# Patient Record
Sex: FEMALE | Race: BLACK OR AFRICAN AMERICAN | URBAN - METROPOLITAN AREA
[De-identification: names, ages, dates, MRNs, and addresses within clinical notes are randomized per-mention and may not be internally consistent; named-entity substitution may affect disease eponyms.]

---

## 2021-09-02 ENCOUNTER — TELEPHONE (OUTPATIENT)
Dept: BARIATRICS/WEIGHT MGMT | Age: 36
End: 2021-09-02

## 2021-09-02 NOTE — TELEPHONE ENCOUNTER
Online Info Session Completed:  on 7/14/21 okay to schedule with Dr. Fransisca Soto   with bc/bs    Patient informed the following: This is NOT a guarantee of payment  When stating that you have a Benefit or Coverage for Bariatric Surgery - that means that you may qualify for the surgery  Bariatric Surgery is considered an elective procedure, patient is responsible to know their benefits . Any information we obtain when calling your insurance  is not  a guarantee of  coverage  and/or  benefit. Appointment Note :   New Patient , bc/bs,   6   month visits,  PG Fee $200,  Mailed Packet or advised to arrive  early      Remind Patient of $200 Program fee with $ 100 required at Second visit with office on initial dietician visit. Remind Patient they must be nicotine free. They will be tested at the beginning of the program and prior to surgery. Advise Patient Responsible for out of pocket, copay at medical visits, Deductible and coinsurance applied to medical visits and procedure. You will be responsible for any of the following:  · Copays   · Deductibles 3200.00  · Co insurances 5000.00    The items mentioned above are indicated or required by your insurance plan. Your deductible and coinsurance are applied to medical visits and procedures. Verified with patient if he or she has had any previous bariatric surgery? no  ( If yes ,advise patient of transfer of care process and program fee)       .

## 2021-09-23 ENCOUNTER — OFFICE VISIT (OUTPATIENT)
Dept: BARIATRICS/WEIGHT MGMT | Age: 36
End: 2021-09-23
Payer: COMMERCIAL

## 2021-09-23 VITALS
HEIGHT: 62 IN | HEART RATE: 96 BPM | WEIGHT: 293 LBS | DIASTOLIC BLOOD PRESSURE: 82 MMHG | BODY MASS INDEX: 53.92 KG/M2 | SYSTOLIC BLOOD PRESSURE: 134 MMHG | RESPIRATION RATE: 20 BRPM

## 2021-09-23 DIAGNOSIS — R06.09 DYSPNEA ON EXERTION: Primary | ICD-10-CM

## 2021-09-23 DIAGNOSIS — E66.01 MORBID OBESITY WITH BMI OF 70 AND OVER, ADULT (HCC): ICD-10-CM

## 2021-09-23 DIAGNOSIS — K21.9 GASTROESOPHAGEAL REFLUX DISEASE WITHOUT ESOPHAGITIS: ICD-10-CM

## 2021-09-23 DIAGNOSIS — R06.83 SNORING: ICD-10-CM

## 2021-09-23 PROCEDURE — 99204 OFFICE O/P NEW MOD 45 MIN: CPT | Performed by: SURGERY

## 2021-09-23 NOTE — PROGRESS NOTES
patients only)    [x] Lifelong diet expectations reviewed with patient    [x] Need for lifelong vitamin supplementation reviewed with patient    PHYSICAL EXAMINATION:      /82 (Site: Right Lower Arm, Position: Sitting, Cuff Size: Large Adult)   Pulse 96   Resp 20   Ht 5' 1.5\" (1.562 m)   Wt (!) 378 lb (171.5 kg)   LMP  (Approximate)   BMI 70.27 kg/m²     Constitutional:  Vital signs are normal. The patient appears well-developed   HEENT:      Head: Normocephalic. Atraumatic     Eyes: pupils are equal and reactive. No scleral icterus is present. Neck: No mass and no thyromegaly present. Cardiovascular: Normal rate, regular rhythm, S1 normal and S2 normal.  Bilateral pulses present. Pulmonary/Chest: Effort normal and breath sounds normal. No retractions. Abdominal: Soft. Normal appearance. There is no organomegaly. No tenderness. There is no rigidity, no rebound, no guarding and no Recinos's sign. Musculoskeletal:      Right lower leg: Normal. No tenderness and no edema. Left lower leg: Normal. No tenderness and no edema. Lymphadenopathy:     No cervical adenopathy, No Exrtemity Adenopathy. Neurological: The patient is alert and oriented. Moving all four extremities equally, sensation grossly intact bilateral.  Skin: Skin is warm, dry and intact. Psychiatric: The patient has a normal mood and affect. Speech is normal and behavior is normal. Judgment and thought content normal. Cognition and memory are normal.     RECOMMENDATIONS:     We spent a great deal of time discussing the risks and benefits of Sleeve Gastrectomy, including but not limited to injury to intra-abdominal organs, breakdown of the gastric staple line, the need for re-operative therapy,  prolonged hospitalization,  mechanical ventilation,  and death.  We discussed the possibility of bleeding, the need for blood transfusions, blood clots, hospital-acquired and intra-abdominal infection, anastomotic stricture, and date of surgery    Labwork:  Final Lab Tests  within 3 months of date of surgery (CBC, PT/PTT, BMP)     Electronically signed by Elise Bullock DO on 9/23/2021 at 1:02 PM

## 2021-10-14 ENCOUNTER — NURSE ONLY (OUTPATIENT)
Dept: BARIATRICS/WEIGHT MGMT | Age: 36
End: 2021-10-14

## 2021-10-14 NOTE — PROGRESS NOTES
Medical Nutrition Therapy  Initial Nutrition Assessment for Metabolic/ Bariatric Surgery  Required insurance visit prior to surgery:  6  Shared with patient the importance of documenting exercise and staying at or below start weight during visits. Pt reports:     Jenae Sr is a 31 y.o. female with a date of birth of 1985. Weight History: Wt Readings from Last 3 Encounters:   09/23/21 (!) 378 lb (171.5 kg)        How does your weight affect your daily activities?fatigue      What would be different in your life if you felt healthier and fit? Why is that important to you now? Don't want to get chronic disease  Do you drink alcohol? . YES, occasionally    Do you use tobacco in the form of cigarettes, cigars, chew or any vapor appliance? No    Weight History      Jesus Adkins's highest adult weight was 378 lbs at age . Patient was at her highest weight for  . Patient's triggers/known causes to her highest weight are . Jesus Adkins's lowest adult weight was 190 lbs at age . Patient was at her lowest weight for  . The lowest weight was achieved through  . Physical Activity  Do you participate in a structured exercise program, step counting or regular physical activity? no      Instructions and exercise logs were provided to patient today see goal sheet and plan. Previous weight loss attempts  Patient has participated in the following weight loss programs:   Calorie restriction, , Scott Tobias, Keto      Nutrition History  Have you ever been diagnosed with an eating disorder? No  Have you ever had problems tolerating a multivitamin or mineral supplement?no  Have you ever been diagnosed with a vitamin or mineral deficiency? no     Patient dines out to a sit down restaurant 3 times per week. Patient dines out to a fast food restaurant 2 times per month. Patient does have grazing. Patient does have night eating.    Patient does not have a history of emotional eating. Patient does have a history of  eating out of boredom. Drinks throughout the day: water, body armour    24 hour recall/food frequency: has been scanned into chart unless completed below. Surgery  Patient's greatest concern about having surgery is: anasthesia. How will you know when you've been successful? Assessment:  Nutritional Needs:  Men: 1500kcal daily minimum     Women 1200kcal daily minimum. 60-80gm of protein daily  PES Statement:  Obesity related to a complex combination of decreased energy needs, disordered eating patterns, physical inactivity, and increased psychological/life stress as evidenced by BMI greater than 30 and inability to maintain a significant amount of weight loss through conventional weight loss interventions. Goals    All goals were planned with and agreed on by the patient. I want to improve my health because for better health  appt # NA G What is your next step? C 1 2 3 4 5 6 7 8 9     0  1 I will read the education binder provided to me and the                 0  2 I will make my pschological evaluation appoinment. 0  3 I will bring this goal card to every appointment. x 4 I will eliminate all tobacco/nicotine. x 5 I will limit alcoholic beverages to 1-1VX per week. 6 I will limit dining out to 3 times per week or less. x 7 I will eliminate sugary beverages. x 8 I will eliminate carbonated beverages. 9 I will eliminate drinking with a straw. 10 I will limit caffeinated beverages to 16oz daily. 0  11 I will limit log my exercise daily. 12 I will determine my calcium and mvi plan. 13 I will have 1-2 servings of lean protein present at each meal and minimeal.                 14 I will eat every 3-5 hours. 15 I will drink 64oz of fluid daily. 16 I will eat slowly during meals and snacks. 17 I will limit fluids 4oz before after and during meals. 18 I will eat protein first at all meals followed by vegetables,  Fruit and lastly whole grains. 23 My first weight neutral approach is:                 20 My second weight neutral approach is:                 21  My Thirds weight neutral approach is:                 22                  23                  24                  25                  Goals reviewed with patient as below  Do you understand your goals? Do you have the information you need to achieve your goals? Do you have any questions  right now? Plan    Exercise for Health 15 easy exercises to do at home with 6 activity logs were provided to the patient with verbal and written instructions on how to carry this out. Goal number 14 was provided to the patient on this visit please see above. Will follow up each month and provide support as patient begins to add physical activity to life style. Monitor and review goals adjust as needed. Follow up monthly supervised diet and exercise.        Gillian Carmona, NIMESH, LD

## 2021-11-22 ENCOUNTER — TELEPHONE (OUTPATIENT)
Dept: BARIATRICS/WEIGHT MGMT | Age: 36
End: 2021-11-22

## 2022-01-06 ENCOUNTER — TELEPHONE (OUTPATIENT)
Dept: BARIATRICS/WEIGHT MGMT | Age: 37
End: 2022-01-06

## 2022-01-28 ENCOUNTER — OFFICE VISIT (OUTPATIENT)
Dept: BARIATRICS/WEIGHT MGMT | Age: 37
End: 2022-01-28
Payer: COMMERCIAL

## 2022-01-28 VITALS
BODY MASS INDEX: 53.92 KG/M2 | SYSTOLIC BLOOD PRESSURE: 137 MMHG | HEIGHT: 62 IN | WEIGHT: 293 LBS | DIASTOLIC BLOOD PRESSURE: 80 MMHG | HEART RATE: 92 BPM

## 2022-01-28 DIAGNOSIS — K21.9 GASTROESOPHAGEAL REFLUX DISEASE WITHOUT ESOPHAGITIS: ICD-10-CM

## 2022-01-28 DIAGNOSIS — E66.01 MORBID OBESITY WITH BMI OF 70 AND OVER, ADULT (HCC): ICD-10-CM

## 2022-01-28 PROCEDURE — 99213 OFFICE O/P EST LOW 20 MIN: CPT | Performed by: NURSE PRACTITIONER

## 2022-01-28 RX ORDER — IBUPROFEN 800 MG/1
TABLET ORAL
COMMUNITY

## 2022-01-28 RX ORDER — ALPRAZOLAM 1 MG/1
TABLET ORAL
COMMUNITY
Start: 2021-12-30

## 2022-01-28 RX ORDER — FAMOTIDINE 20 MG/1
TABLET, FILM COATED ORAL
COMMUNITY
Start: 2021-12-30 | End: 2022-08-31

## 2022-01-28 RX ORDER — KETOCONAZOLE 20 MG/ML
SHAMPOO TOPICAL
COMMUNITY
Start: 2021-12-07 | End: 2022-08-31

## 2022-01-28 RX ORDER — MEDROXYPROGESTERONE ACETATE 150 MG/ML
INJECTION, SUSPENSION INTRAMUSCULAR
COMMUNITY
End: 2022-03-18 | Stop reason: ALTCHOICE

## 2022-01-28 RX ORDER — ALBUTEROL SULFATE 90 UG/1
AEROSOL, METERED RESPIRATORY (INHALATION)
COMMUNITY
Start: 2021-12-30

## 2022-01-28 NOTE — PROGRESS NOTES
Medical Weight Management Progress Note    Subjective      Patient being seen for medically supervised weight loss for the chronic conditions of GERD. She is working on the behavior changes discussed at the initial appointment. Patient continues on diet plan. Physical activity includes exercising at the gym. Weight today is 381 lbs. Needs to schedule sleep study, psych eval, and EGD. No current issues. Working toward bariatric surgery:    [x] Sleeve Gastrectomy                                                           [] Briana-en-Y Gastric Bypass    Allergies:  No Known Allergies    Past Medical History:   History reviewed. No pertinent past medical history. .    Past Surgical History:  Past Surgical History:   Procedure Laterality Date     SECTION         Family History:  History reviewed. No pertinent family history. Social History:  Social History     Socioeconomic History    Marital status: Single     Spouse name: Not on file    Number of children: Not on file    Years of education: Not on file    Highest education level: Not on file   Occupational History    Not on file   Tobacco Use    Smoking status: Former Smoker    Smokeless tobacco: Never Used   Substance and Sexual Activity    Alcohol use: Yes     Alcohol/week: 1.0 standard drink     Types: 1 Glasses of wine per week     Comment: occ    Drug use: Never    Sexual activity: Not on file   Other Topics Concern    Not on file   Social History Narrative    Not on file     Social Determinants of Health     Financial Resource Strain:     Difficulty of Paying Living Expenses: Not on file   Food Insecurity:     Worried About Running Out of Food in the Last Year: Not on file    Yung of Food in the Last Year: Not on file   Transportation Needs:     Lack of Transportation (Medical): Not on file    Lack of Transportation (Non-Medical):  Not on file   Physical Activity:     Days of Exercise per Week: Not on file    Minutes of Exercise per Session: Not on file   Stress:     Feeling of Stress : Not on file   Social Connections:     Frequency of Communication with Friends and Family: Not on file    Frequency of Social Gatherings with Friends and Family: Not on file    Attends [employer] Services: Not on file    Active Member of [address] [place] [address] or Organizations: Not on file    Attends Club or Organization Meetings: Not on file    Marital Status: Not on file   Intimate Partner Violence:     Fear of Current or Ex-Partner: Not on file    Emotionally Abused: Not on file    Physically Abused: Not on file    Sexually Abused: Not on file   Housing Stability:     Unable to Pay for Housing in the Last Year: Not on file    Number of Jicelestemomarisela in the Last Year: Not on file    Unstable Housing in the Last Year: Not on file       Current Medications:  Current Outpatient Medications   Medication Sig Dispense Refill    famotidine (PEPCID) 20 MG tablet       ALPRAZolam (XANAX) 1 MG tablet       medroxyPROGESTERone (DEPO-PROVERA) 150 MG/ML injection medroxyprogesterone 150 mg/mL intramuscular suspension   inject 1ml intramuscularly once monthly      ketoconazole (NIZORAL) 2 % shampoo APPLY SHAMPOO TOPICIALLY THREE TIMES WEEKLY      ibuprofen (ADVIL;MOTRIN) 800 MG tablet ibuprofen 800 mg tablet      albuterol sulfate  (90 Base) MCG/ACT inhaler       diphenhydrAMINE HCl (BENADRYL ALLERGY PO) Take by mouth       No current facility-administered medications for this visit. Vital Signs:  /80 (Site: Left Lower Arm, Position: Sitting, Cuff Size: Large Adult)   Pulse 92   Ht 5' 1.5\" (1.562 m)   Wt (!) 381 lb (172.8 kg)   BMI 70.82 kg/m²     BMI/Height/Weight:  Body mass index is 70.82 kg/m². Review of Systems - A review of systems was performed. All was negative unless otherwise documented in HPI. Constitutional: Negative for fever, chills and diaphoresis. HENT: Negative for hearing loss and trouble swallowing.    Eyes: Negative for photophobia and visual disturbance. Respiratory: Negative for cough, shortness of breath and wheezing. Cardiovascular: Negative for chest pain and palpitations. Gastrointestinal: Negative for nausea, vomiting, abdominal pain, diarrhea, constipation, blood in stool and abdominal distention. Endocrine: Negative for polydipsia, polyphagia and polyuria. Genitourinary: Negative for dysuria, frequency, hematuria and difficulty urinating. Musculoskeletal: Negative for myalgias, joint swelling. Skin: Negative for pallor and rash. Neurological: Negative for dizziness, tremors, light-headedness and headaches. Psychiatric/Behavioral: Negative for sleep disturbance and dysphoric mood. Objective:      Physical Exam   Vital signs reviewed. General: Well-developed and well-nourished. No acute distress. Skin: Warm, dry and intact. HEENT: Normocephalic. EOMs intact. Conjunctivae normal. Neck supple. Cardiovascular: Normal rate, regular rhythm. Pulmonary/Chest: Normal effort. Lungs clear to auscultation. No rales, rhonchi or wheezing. Abdominal: Positive bowel sounds. Soft, nontender. Nondistended. Musculoskeletal: Movement x4. No edema. Neurological: Gait normal. Alert and oriented to person, place, and time. Psychiatric: Normal mood and affect. Speech and behavior normal. Judgment and thought content normal. Cognition and memory intact. Assessment:       Diagnosis Orders   1. Gastroesophageal reflux disease without esophagitis  CBC Auto Differential    Comprehensive Metabolic Panel    Hemoglobin A1C    Iron and TIBC    Magnesium    Lipid Panel    PTH, Intact    TSH without Reflex    Vitamin A    Vitamin B1    Vitamin D 25 Hydroxy    Vitamin B12 & Folate    Zinc   2.  Morbid obesity with BMI of 70 and over, adult ([facility] [address]  CBC Auto Differential    Comprehensive Metabolic Panel    Hemoglobin A1C    Iron and TIBC    Magnesium    Lipid Panel    PTH, Intact    TSH without Reflex Vitamin A    Vitamin B1    Vitamin D 25 Hydroxy    Vitamin B12 & Folate    Zinc       Plan:    Dietitian visit today. Patient was encouraged to journal all food intake. Keep calorie level at approximately 4972-7845. Protein intake is to be a minimum of 60-80 grams per day. Water drinking was encouraged with a goal of 64oz-128oz daily. Beverages to be calorie free except for milk. Every other beverage should be water. Avoid soda. Continue to increase level of physical activity. Encouraged use of exercise log. Follow-up  Return in about 1 month (around 2/28/2022). Orders this encounter:  Orders Placed This Encounter   Procedures    CBC Auto Differential     Standing Status:   Future     Standing Expiration Date:   1/28/2023    Comprehensive Metabolic Panel     Standing Status:   Future     Standing Expiration Date:   1/28/2023    Hemoglobin A1C     Standing Status:   Future     Standing Expiration Date:   1/28/2023    Iron and TIBC     Standing Status:   Future     Standing Expiration Date:   1/28/2023     Order Specific Question:   Is Patient Fasting? Answer:   yes     Order Specific Question:   No of Hours?      Answer:   15    Magnesium     Standing Status:   Future     Standing Expiration Date:   1/28/2023    Lipid Panel     Standing Status:   Future     Standing Expiration Date:   1/28/2023     Order Specific Question:   Is Patient Fasting?/# of Hours     Answer:   12    PTH, Intact     Standing Status:   Future     Standing Expiration Date:   1/28/2023    TSH without Reflex     Standing Status:   Future     Standing Expiration Date:   1/28/2023    Vitamin A     Standing Status:   Future     Standing Expiration Date:   1/28/2023    Vitamin B1     Standing Status:   Future     Standing Expiration Date:   1/28/2023    Vitamin D 25 Hydroxy     Standing Status:   Future     Standing Expiration Date:   1/28/2023    Vitamin B12 & Folate     Standing Status:   Future     Standing Expiration Date:   1/28/2023    Zinc     Standing Status:   Future     Standing Expiration Date:   1/28/2023       Prescriptions this encounter:  No orders of the defined types were placed in this encounter.       Electronically signed by:  Olga Lidia Mcclure CNP

## 2022-01-28 NOTE — PROGRESS NOTES
Medical Nutrition Therapy   Metabolic and Bariatric Surgery         Supervised diet and exercise preparation  Visit 1 out of 6  Pt reports:  She forgot her exercise logs and education book but states she is going to the gym regularly. Changes in eating patterns to promote health are noted below on the goals number 19-22    Vitals: Wt Readings from Last 3 Encounters:   01/28/22 (!) 381 lb (172.8 kg)   09/23/21 (!) 378 lb (171.5 kg)         Nutrition Assessment:   PES: Knowledge deficit related to healthy behaviors that support weight management post weight loss surgery as evidenced by Body mass index is 70.82 kg/m². Nutrition Assessment of Goal Attainment:  TREATMENT GOALS:    1. Pt  Completed 0 out of 4 goals. 2.TREATMENT GOALS FOR UPCOMING WEEK: continue all previous goals and add: # 13, 19    All goals were planned with and agreed on by the patient. I want to improve my health because for better health  appt # NA G What is your next step? C 1 2 3 4 5 6 7 8 9     1  1 I will read the education binder provided to me and the    0             1  2 I will make my pschological evaluation appoinment. 0             1  3 I will bring this goal card to every appointment. 0              x 4 I will eliminate all tobacco/nicotine. x 5 I will limit alcoholic beverages to 8-5JA per week. 6 I will limit dining out to 3 times per week or less. x 7 I will eliminate sugary beverages. x 8 I will eliminate carbonated beverages. 9 I will eliminate drinking with a straw. 10 I will limit caffeinated beverages to 16oz daily. 1  11 I will limit log my exercise daily. 0               12 I will determine my calcium and mvi plan. 1  13 I will have 1-2 servings of lean protein present at each meal and minimeal.                 14 I will eat every 3-5 hours.                  15 I will drink 64oz of fluid daily. 16 I will eat slowly during meals and snacks. 17 I will limit fluids 4oz before after and during meals. 18 I will eat protein first at all meals followed by vegetables,  Fruit and lastly whole grains. 1  19 My first weight neutral approach is:  I will eat more colorful vegetables. 20 My second weight neutral approach is:                 21  My Thirds weight neutral approach is:                 22                  23                  24                    Questions asked for goal understanding:                                                  Do you understand your goals? Do you have the information you need to achieve your goals? Do you have any questions  right now? []  Consistent goal achievement in the program thus far and further success with goals is expected. []  Unable to consistently make progress in goal achievement. At this time patient is not moving forward  in developing the skills needed for success after surgery. Plan:    Continue to follow monthly and review goals.          [x]  Nutrition visits complete    []

## 2022-02-24 ENCOUNTER — OFFICE VISIT (OUTPATIENT)
Dept: BARIATRICS/WEIGHT MGMT | Age: 37
End: 2022-02-24
Payer: COMMERCIAL

## 2022-02-24 VITALS
HEART RATE: 97 BPM | HEIGHT: 62 IN | DIASTOLIC BLOOD PRESSURE: 70 MMHG | WEIGHT: 293 LBS | BODY MASS INDEX: 53.92 KG/M2 | SYSTOLIC BLOOD PRESSURE: 113 MMHG

## 2022-02-24 DIAGNOSIS — K21.9 GASTROESOPHAGEAL REFLUX DISEASE WITHOUT ESOPHAGITIS: Primary | ICD-10-CM

## 2022-02-24 DIAGNOSIS — E66.01 MORBID OBESITY WITH BMI OF 70 AND OVER, ADULT (HCC): ICD-10-CM

## 2022-02-24 PROCEDURE — 99213 OFFICE O/P EST LOW 20 MIN: CPT | Performed by: NURSE PRACTITIONER

## 2022-02-24 RX ORDER — SULFAMETHOXAZOLE AND TRIMETHOPRIM 800; 160 MG/1; MG/1
TABLET ORAL
COMMUNITY
Start: 2022-02-22 | End: 2022-03-18 | Stop reason: ALTCHOICE

## 2022-02-24 NOTE — PROGRESS NOTES
Medical Nutrition Therapy   Metabolic and Bariatric Surgery         Supervised diet and exercise preparation  Visit 2 out of 6  Pt reports:  She states she is trying to incorporate small, more frequent meals. Changes in eating patterns to promote health are noted below on the goals number 19-22    Vitals: Wt Readings from Last 3 Encounters:   02/24/22 (!) 390 lb (176.9 kg)   01/28/22 (!) 381 lb (172.8 kg)   09/23/21 (!) 378 lb (171.5 kg)         Nutrition Assessment:   PES: Knowledge deficit related to healthy behaviors that support weight management post weight loss surgery as evidenced by Body mass index is 72.5 kg/m². Nutrition Assessment of Goal Attainment:  TREATMENT GOALS:    1. Pt  Completed 5 out of 6 goals. 2.TREATMENT GOALS FOR UPCOMING WEEK: continue all previous goals and add: # 0    All goals were planned with and agreed on by the patient. I want to improve my health because for better health  appt # NA G What is your next step? C 1 2 3 4 5 6 7 8 9     1  1 I will read the education binder provided to me and the   x 0 100            2  2 I will make my pschological evaluation appoinment. 0 0            2  3 I will bring this goal card to every appointment. 0 100             x 4 I will eliminate all tobacco/nicotine. x 5 I will limit alcoholic beverages to 7-8FH per week. 6 I will limit dining out to 3 times per week or less. x 7 I will eliminate sugary beverages. x 8 I will eliminate carbonated beverages. 9 I will eliminate drinking with a straw. 10 I will limit caffeinated beverages to 16oz daily. 2  11 I will limit log my exercise daily. 0 100              12 I will determine my calcium and mvi plan. 2  13 I will have 1-2 servings of lean protein present at each meal and minimeal.   100              14 I will eat every 3-5 hours.                  15 I will drink 64oz of fluid daily. 16 I will eat slowly during meals and snacks. 17 I will limit fluids 4oz before after and during meals. 18 I will eat protein first at all meals followed by vegetables,  Fruit and lastly whole grains. 2  19 My first weight neutral approach is:  I will eat more colorful vegetables. 100              20 My second weight neutral approach is:                 21  My Thirds weight neutral approach is:                 22                  23                  24                    Questions asked for goal understanding:                                                  Do you understand your goals? Do you have the information you need to achieve your goals? Do you have any questions  right now? [x]  Consistent goal achievement in the program thus far and further success with goals is expected. []  Unable to consistently make progress in goal achievement. At this time patient is not moving forward  in developing the skills needed for success after surgery. Plan:    Continue to follow monthly and review goals.          [x]  Nutrition visits complete    []

## 2022-02-24 NOTE — PROGRESS NOTES
Medical Weight Management Progress Note    Subjective      Patient being seen for medically supervised weight loss for the chronic conditions of GERD. She is working on the behavior changes discussed at the initial appointment. Patient continues on diet plan. Physical activity includes exercising at the gym. Weight gain of 9 lbs since last visit. Needs to schedule sleep study, psych eval, and EGD. Needs to have labs drawn. No current issues. Working toward bariatric surgery:    [x] Sleeve Gastrectomy                                                           [] Briana-en-Y Gastric Bypass    Allergies:  No Known Allergies    Past Medical History:   History reviewed. No pertinent past medical history. .    Past Surgical History:  Past Surgical History:   Procedure Laterality Date     SECTION         Family History:  History reviewed. No pertinent family history. Social History:  Social History     Socioeconomic History    Marital status: Single     Spouse name: Not on file    Number of children: Not on file    Years of education: Not on file    Highest education level: Not on file   Occupational History    Not on file   Tobacco Use    Smoking status: Former Smoker    Smokeless tobacco: Never Used   Substance and Sexual Activity    Alcohol use: Yes     Alcohol/week: 1.0 standard drink     Types: 1 Glasses of wine per week     Comment: occ    Drug use: Never    Sexual activity: Not on file   Other Topics Concern    Not on file   Social History Narrative    Not on file     Social Determinants of Health     Financial Resource Strain:     Difficulty of Paying Living Expenses: Not on file   Food Insecurity:     Worried About Running Out of Food in the Last Year: Not on file    Yung of Food in the Last Year: Not on file   Transportation Needs:     Lack of Transportation (Medical): Not on file    Lack of Transportation (Non-Medical):  Not on file   Physical Activity:     Days of Exercise per Week: Not on file    Minutes of Exercise per Session: Not on file   Stress:     Feeling of Stress : Not on file   Social Connections:     Frequency of Communication with Friends and Family: Not on file    Frequency of Social Gatherings with Friends and Family: Not on file    Attends Pentec[employer]alism Services: Not on file    Active Member of [address] [place] [address] or Organizations: Not on file    Attends Club or Organization Meetings: Not on file    Marital Status: Not on file   Intimate Partner Violence:     Fear of Current or Ex-Partner: Not on file    Emotionally Abused: Not on file    Physically Abused: Not on file    Sexually Abused: Not on file   Housing Stability:     Unable to Pay for Housing in the Last Year: Not on file    Number of [place] in the Last Year: Not on file    Unstable Housing in the Last Year: Not on file       Current Medications:  Current Outpatient Medications   Medication Sig Dispense Refill    sulfamethoxazole-trimethoprim (BACTRIM DS;SEPTRA DS) 800-160 MG per tablet TAKE 1 TABLET BY MOUTH EVERY 12 HOURS AS DIRECTED      famotidine (PEPCID) 20 MG tablet       ALPRAZolam (XANAX) 1 MG tablet       medroxyPROGESTERone (DEPO-PROVERA) 150 MG/ML injection medroxyprogesterone 150 mg/mL intramuscular suspension   inject 1ml intramuscularly once monthly      ketoconazole (NIZORAL) 2 % shampoo APPLY SHAMPOO TOPICIALLY THREE TIMES WEEKLY      ibuprofen (ADVIL;MOTRIN) 800 MG tablet ibuprofen 800 mg tablet      albuterol sulfate  (90 Base) MCG/ACT inhaler       diphenhydrAMINE HCl (BENADRYL ALLERGY PO) Take by mouth       No current facility-administered medications for this visit. Vital Signs:  /70 (Site: Right Lower Arm, Position: Sitting, Cuff Size: Large Adult)   Pulse 97   Ht 5' 1.5\" (1.562 m)   Wt (!) 390 lb (176.9 kg)   BMI 72.50 kg/m²     BMI/Height/Weight:  Body mass index is 72.5 kg/m². Review of Systems - A review of systems was performed.   All was negative unless otherwise documented in HPI. Constitutional: Negative for fever, chills and diaphoresis. HENT: Negative for hearing loss and trouble swallowing. Eyes: Negative for photophobia and visual disturbance. Respiratory: Negative for cough, shortness of breath and wheezing. Cardiovascular: Negative for chest pain and palpitations. Gastrointestinal: Negative for nausea, vomiting, abdominal pain, diarrhea, constipation, blood in stool and abdominal distention. Endocrine: Negative for polydipsia, polyphagia and polyuria. Genitourinary: Negative for dysuria, frequency, hematuria and difficulty urinating. Musculoskeletal: Negative for myalgias, joint swelling. Skin: Negative for pallor and rash. Neurological: Negative for dizziness, tremors, light-headedness and headaches. Psychiatric/Behavioral: Negative for sleep disturbance and dysphoric mood. Objective:      Physical Exam   Vital signs reviewed. General: Well-developed and well-nourished. No acute distress. Skin: Warm, dry and intact. HEENT: Normocephalic. EOMs intact. Conjunctivae normal. Neck supple. Cardiovascular: Normal rate, regular rhythm. Pulmonary/Chest: Normal effort. Lungs clear to auscultation. No rales, rhonchi or wheezing. Abdominal: Positive bowel sounds. Soft, nontender. Nondistended. Musculoskeletal: Movement x4. No edema. Neurological: Gait normal. Alert and oriented to person, place, and time. Psychiatric: Normal mood and affect. Speech and behavior normal. Judgment and thought content normal. Cognition and memory intact. Assessment:       Diagnosis Orders   1. Gastroesophageal reflux disease without esophagitis     2. Morbid obesity with BMI of 70 and over, adult [facility])         Plan:    Dietitian visit today. Patient was encouraged to journal all food intake. Keep calorie level at approximately 4299-0396. Protein intake is to be a minimum of 60-80 grams per day.  Water drinking was

## 2022-03-10 LAB
ALBUMIN SERPL-MCNC: 3.8 G/DL
ALP BLD-CCNC: 65 U/L
ALT SERPL-CCNC: 14 U/L
ANION GAP SERPL CALCULATED.3IONS-SCNC: 11 MMOL/L
AST SERPL-CCNC: 12 U/L
AVERAGE GLUCOSE: 131
BASOPHILS ABSOLUTE: 0.1 /ΜL
BASOPHILS RELATIVE PERCENT: 0.6 %
BILIRUB SERPL-MCNC: 0.3 MG/DL (ref 0.1–1.4)
BUN BLDV-MCNC: 15 MG/DL
CALCIUM SERPL-MCNC: 8.7 MG/DL
CHLORIDE BLD-SCNC: 108 MMOL/L
CHOLESTEROL, TOTAL: 162 MG/DL
CHOLESTEROL/HDL RATIO: 4.6
CO2: 24 MMOL/L
CREAT SERPL-MCNC: 1.04 MG/DL
EOSINOPHILS ABSOLUTE: 0.1 /ΜL
EOSINOPHILS RELATIVE PERCENT: 1.3 %
FOLATE: 8.4
GFR CALCULATED: NORMAL
GLUCOSE BLD-MCNC: 106 MG/DL
HBA1C MFR BLD: 6.2 %
HCT VFR BLD CALC: 35.7 % (ref 36–46)
HDLC SERPL-MCNC: 35 MG/DL (ref 35–70)
HEMOGLOBIN: 11.8 G/DL (ref 12–16)
IRON: 31
LDL CHOLESTEROL CALCULATED: 69 MG/DL (ref 0–160)
LYMPHOCYTES ABSOLUTE: 2.6 /ΜL
LYMPHOCYTES RELATIVE PERCENT: 29.4 %
MAGNESIUM: 1.7 MG/DL
MCH RBC QN AUTO: 28.7 PG
MCHC RBC AUTO-ENTMCNC: 33.1 G/DL
MCV RBC AUTO: 87 FL
MONOCYTES ABSOLUTE: 0.6 /ΜL
MONOCYTES RELATIVE PERCENT: 6.1 %
NEUTROPHILS ABSOLUTE: 5.6 /ΜL
NEUTROPHILS RELATIVE PERCENT: 62.6 %
NONHDLC SERPL-MCNC: NORMAL MG/DL
PDW BLD-RTO: 15.2 %
PLATELET # BLD: 287 K/ΜL
PMV BLD AUTO: 8.8 FL
POTASSIUM SERPL-SCNC: 3.8 MMOL/L
PTH INTACT: 45
RBC # BLD: 4.11 10^6/ΜL
SODIUM BLD-SCNC: 143 MMOL/L
TOTAL IRON BINDING CAPACITY: NORMAL
TOTAL PROTEIN: 7.2
TRIGL SERPL-MCNC: 290 MG/DL
TSH SERPL DL<=0.05 MIU/L-ACNC: 0.97 UIU/ML
VITAMIN B-12: 176
VITAMIN D 25-HYDROXY: <7
VITAMIN D2, 25 HYDROXY: NORMAL
VITAMIN D3,25 HYDROXY: NORMAL
VLDLC SERPL CALC-MCNC: 58 MG/DL
WBC # BLD: 9 10^3/ML

## 2022-03-16 DIAGNOSIS — K21.9 GASTROESOPHAGEAL REFLUX DISEASE WITHOUT ESOPHAGITIS: ICD-10-CM

## 2022-03-16 DIAGNOSIS — E66.01 MORBID OBESITY WITH BMI OF 70 AND OVER, ADULT (HCC): ICD-10-CM

## 2022-03-17 DIAGNOSIS — E55.9 VITAMIN D DEFICIENCY: ICD-10-CM

## 2022-03-17 DIAGNOSIS — E53.8 LOW VITAMIN B12 LEVEL: ICD-10-CM

## 2022-03-17 DIAGNOSIS — E61.1 LOW IRON: ICD-10-CM

## 2022-03-17 DIAGNOSIS — E55.9 VITAMIN D DEFICIENCY: Primary | ICD-10-CM

## 2022-03-17 PROBLEM — E78.1 HYPERTRIGLYCERIDEMIA: Status: ACTIVE | Noted: 2022-03-17

## 2022-03-17 PROBLEM — E60 LOW ZINC LEVEL: Status: ACTIVE | Noted: 2022-03-17

## 2022-03-17 PROBLEM — R79.0 LOW MAGNESIUM LEVEL: Status: ACTIVE | Noted: 2022-03-17

## 2022-03-17 PROBLEM — R73.03 PREDIABETES: Status: ACTIVE | Noted: 2022-03-17

## 2022-03-17 RX ORDER — ERGOCALCIFEROL 1.25 MG/1
CAPSULE ORAL
Qty: 12 CAPSULE | Refills: 0 | Status: SHIPPED | OUTPATIENT
Start: 2022-03-17

## 2022-03-17 RX ORDER — FERROUS SULFATE 325(65) MG
325 TABLET ORAL
Qty: 30 TABLET | Refills: 0 | Status: SHIPPED | OUTPATIENT
Start: 2022-03-17

## 2022-03-17 RX ORDER — ERGOCALCIFEROL 1.25 MG/1
50000 CAPSULE ORAL WEEKLY
Qty: 8 CAPSULE | Refills: 0 | Status: SHIPPED | OUTPATIENT
Start: 2022-03-17 | End: 2022-03-17

## 2022-03-17 RX ORDER — CHOLECALCIFEROL (VITAMIN D3) 125 MCG
500 CAPSULE ORAL DAILY
Qty: 30 TABLET | Refills: 11 | Status: SHIPPED | OUTPATIENT
Start: 2022-03-17 | End: 2022-03-17

## 2022-03-17 RX ORDER — CHOLECALCIFEROL (VITAMIN D3) 125 MCG
500 CAPSULE ORAL DAILY
Qty: 90 TABLET | Refills: 0 | Status: SHIPPED | OUTPATIENT
Start: 2022-03-17 | End: 2023-03-17

## 2022-03-18 ENCOUNTER — OFFICE VISIT (OUTPATIENT)
Dept: BARIATRICS/WEIGHT MGMT | Age: 37
End: 2022-03-18
Payer: COMMERCIAL

## 2022-03-18 VITALS
HEART RATE: 63 BPM | BODY MASS INDEX: 53.92 KG/M2 | SYSTOLIC BLOOD PRESSURE: 138 MMHG | WEIGHT: 293 LBS | HEIGHT: 62 IN | DIASTOLIC BLOOD PRESSURE: 88 MMHG

## 2022-03-18 DIAGNOSIS — R73.03 PREDIABETES: ICD-10-CM

## 2022-03-18 DIAGNOSIS — K21.9 GASTROESOPHAGEAL REFLUX DISEASE WITHOUT ESOPHAGITIS: Primary | ICD-10-CM

## 2022-03-18 DIAGNOSIS — E66.01 MORBID OBESITY WITH BMI OF 70 AND OVER, ADULT (HCC): ICD-10-CM

## 2022-03-18 DIAGNOSIS — E60 LOW ZINC LEVEL: ICD-10-CM

## 2022-03-18 DIAGNOSIS — E55.9 VITAMIN D DEFICIENCY: ICD-10-CM

## 2022-03-18 DIAGNOSIS — E78.1 HYPERTRIGLYCERIDEMIA: ICD-10-CM

## 2022-03-18 DIAGNOSIS — E61.1 LOW IRON: ICD-10-CM

## 2022-03-18 DIAGNOSIS — R79.0 LOW MAGNESIUM LEVEL: ICD-10-CM

## 2022-03-18 DIAGNOSIS — E53.8 LOW SERUM VITAMIN B12: ICD-10-CM

## 2022-03-18 PROCEDURE — 99213 OFFICE O/P EST LOW 20 MIN: CPT | Performed by: NURSE PRACTITIONER

## 2022-03-18 NOTE — PROGRESS NOTES
Medical Weight Management Progress Note    Subjective      Patient being seen for medically supervised weight loss for the chronic conditions of GERD. She is working on the behavior changes discussed at the initial appointment. Patient continues on diet plan. Physical activity includes exercising at the gym. Weight loss of 3 lbs since last visit. Sleep study scheduled. Needs to schedule psych eval.  EGD scheduled 4/15/22. Needs pulmonary clearance. No current issues. Working toward bariatric surgery:    [x] Sleeve Gastrectomy                                                           [] Briana-en-Y Gastric Bypass    Allergies:  No Known Allergies    Past Medical History:   History reviewed. No pertinent past medical history. .    Past Surgical History:  Past Surgical History:   Procedure Laterality Date     SECTION         Family History:  History reviewed. No pertinent family history. Social History:  Social History     Socioeconomic History    Marital status: Single     Spouse name: Not on file    Number of children: Not on file    Years of education: Not on file    Highest education level: Not on file   Occupational History    Not on file   Tobacco Use    Smoking status: Former Smoker    Smokeless tobacco: Never Used   Substance and Sexual Activity    Alcohol use: Yes     Alcohol/week: 1.0 standard drink     Types: 1 Glasses of wine per week     Comment: occ    Drug use: Never    Sexual activity: Not on file   Other Topics Concern    Not on file   Social History Narrative    Not on file     Social Determinants of Health     Financial Resource Strain:     Difficulty of Paying Living Expenses: Not on file   Food Insecurity:     Worried About Running Out of Food in the Last Year: Not on file    Yung of Food in the Last Year: Not on file   Transportation Needs:     Lack of Transportation (Medical): Not on file    Lack of Transportation (Non-Medical):  Not on file   Physical Activity:     Days of Exercise per Week: Not on file    Minutes of Exercise per Session: Not on file   Stress:     Feeling of Stress : Not on file   Social Connections:     Frequency of Communication with Friends and Family: Not on file    Frequency of Social Gatherings with Friends and Family: Not on file    Attends Protestant Services: Not on file    Active Member of Clubs or Organizations: Not on file    Attends Club or Organization Meetings: Not on file    Marital Status: Not on file   Intimate Partner Violence:     Fear of Current or Ex-Partner: Not on file    Emotionally Abused: Not on file    Physically Abused: Not on file    Sexually Abused: Not on file   Housing Stability:     Unable to Pay for Housing in the Last Year: Not on file    Number of Places Lived in the Last Year: Not on file    Unstable Housing in the Last Year: Not on file       Current Medications:  Current Outpatient Medications   Medication Sig Dispense Refill    ferrous sulfate (IRON 325) 325 (65 Fe) MG tablet Take 1 tablet by mouth daily (with breakfast) 30 tablet 0    vitamin D (ERGOCALCIFEROL) 1.25 MG (71669 UT) CAPS capsule TAKE 1 CAPSULE BY MOUTH 1 TIME A WEEK FOR 8 DOSES 12 capsule 0    vitamin B-12 (CYANOCOBALAMIN) 500 MCG tablet TAKE 1 TABLET BY MOUTH DAILY 90 tablet 0    famotidine (PEPCID) 20 MG tablet       ALPRAZolam (XANAX) 1 MG tablet       ketoconazole (NIZORAL) 2 % shampoo APPLY SHAMPOO TOPICIALLY THREE TIMES WEEKLY      ibuprofen (ADVIL;MOTRIN) 800 MG tablet ibuprofen 800 mg tablet      albuterol sulfate  (90 Base) MCG/ACT inhaler       diphenhydrAMINE HCl (BENADRYL ALLERGY PO) Take by mouth       No current facility-administered medications for this visit. Vital Signs:  /88 (Site: Right Lower Arm, Position: Sitting, Cuff Size: Large Adult)   Pulse 63   Ht 5' 1.5\" (1.562 m)   Wt (!) 387 lb (175.5 kg)   BMI 71.94 kg/m²     BMI/Height/Weight:  Body mass index is 71.94 kg/m². Review of Systems - A review of systems was performed. All was negative unless otherwise documented in HPI. Constitutional: Negative for fever, chills and diaphoresis. HENT: Negative for hearing loss and trouble swallowing. Eyes: Negative for photophobia and visual disturbance. Respiratory: Negative for cough, shortness of breath and wheezing. Cardiovascular: Negative for chest pain and palpitations. Gastrointestinal: Negative for nausea, vomiting, abdominal pain, diarrhea, constipation, blood in stool and abdominal distention. Endocrine: Negative for polydipsia, polyphagia and polyuria. Genitourinary: Negative for dysuria, frequency, hematuria and difficulty urinating. Musculoskeletal: Negative for myalgias, joint swelling. Skin: Negative for pallor and rash. Neurological: Negative for dizziness, tremors, light-headedness and headaches. Psychiatric/Behavioral: Negative for sleep disturbance and dysphoric mood. Objective:      Physical Exam   Vital signs reviewed. General: Well-developed and well-nourished. No acute distress. Skin: Warm, dry and intact. HEENT: Normocephalic. EOMs intact. Conjunctivae normal. Neck supple. Cardiovascular: Normal rate, regular rhythm. Pulmonary/Chest: Normal effort. Lungs clear to auscultation. No rales, rhonchi or wheezing. Abdominal: Positive bowel sounds. Soft, nontender. Nondistended. Musculoskeletal: Movement x4. No edema. Neurological: Gait normal. Alert and oriented to person, place, and time. Psychiatric: Normal mood and affect. Speech and behavior normal. Judgment and thought content normal. Cognition and memory intact. Assessment:       Diagnosis Orders   1. Gastroesophageal reflux disease without esophagitis     2. Hypertriglyceridemia     3. Morbid obesity with BMI of 70 and over, adult ([facility] [address]     4. Vitamin D deficiency     5. Low serum vitamin B12     6. Low iron     7. Low magnesium level     8. Prediabetes     9.  Low zinc level         Plan:    Dietitian visit today. Patient was encouraged to journal all food intake. Keep calorie level at approximately 4676-2218. Protein intake is to be a minimum of 60-80 grams per day. Water drinking was encouraged with a goal of 64oz-128oz daily. Beverages to be calorie free except for milk. Every other beverage should be water. Avoid soda. Continue to increase level of physical activity. Encouraged use of exercise log. Labs from 3/10/22 reviewed and discussed with patient. Multiple labs abnormalities.  Vitamin D, B12 and iron low and already e-prescribed.  Magnesium and Zinc low.  She was advised to use OTC magnesium and zinc supplementation daily.  TG elevated.  A1C 6.2%, indicating prediabetes.  Lab results to be sent to PCP. Follow-up  Return in about 1 month (around 4/18/2022). Orders this encounter:  No orders of the defined types were placed in this encounter. Prescriptions this encounter:  No orders of the defined types were placed in this encounter.       Electronically signed by:  Aida Farmer CNP

## 2022-03-18 NOTE — PROGRESS NOTES
Medical Nutrition Therapy   Metabolic and Bariatric Surgery         Supervised diet and exercise preparation  Visit 3 out of 6    Changes in eating patterns to promote health are noted below on the goals number 19-22    Vitals: Wt Readings from Last 3 Encounters:   03/18/22 (!) 387 lb (175.5 kg)   02/24/22 (!) 390 lb (176.9 kg)   01/28/22 (!) 381 lb (172.8 kg)         Nutrition Assessment:   PES: Knowledge deficit related to healthy behaviors that support weight management post weight loss surgery as evidenced by Body mass index is 71.94 kg/m². Nutrition Assessment of Goal Attainment:  TREATMENT GOALS:    1. Pt  Completed 2 out of 5 goals. 2.TREATMENT GOALS FOR UPCOMING WEEK: continue all previous goals and add: # 0    All goals were planned with and agreed on by the patient. I want to improve my health because for better health  appt # NA G What is your next step? C 1 2 3 4 5 6 7 8 9     1  1 I will read the education binder provided to me and the   x 0 100            3  2 I will make my pschological evaluation appoinment. 0 0 0           3  3 I will bring this goal card to every appointment. 0 100 0            x 4 I will eliminate all tobacco/nicotine. x 5 I will limit alcoholic beverages to 6-1SR per week. 6 I will limit dining out to 3 times per week or less. x 7 I will eliminate sugary beverages. x 8 I will eliminate carbonated beverages. 9 I will eliminate drinking with a straw. 10 I will limit caffeinated beverages to 16oz daily. 3  11 I will limit log my exercise daily. 0 100 0             12 I will determine my calcium and mvi plan. 2  13 I will have 1-2 servings of lean protein present at each meal and minimeal. x  100 100            x 14 I will eat every 3-5 hours. 15 I will drink 64oz of fluid daily.                 x 16 I will eat slowly during meals and snacks. 17 I will limit fluids 4oz before after and during meals. 18 I will eat protein first at all meals followed by vegetables,  Fruit and lastly whole grains. 2  19 My first weight neutral approach is:  I will eat more colorful vegetables. x  100 100             20 My second weight neutral approach is:                 21  My Thirds weight neutral approach is:                 22                  23                  24                    Questions asked for goal understanding:                                                  Do you understand your goals? Do you have the information you need to achieve your goals? Do you have any questions  right now? [x]  Consistent goal achievement in the program thus far and further success with goals is expected. []  Unable to consistently make progress in goal achievement. At this time patient is not moving forward  in developing the skills needed for success after surgery. Plan:    Continue to follow monthly and review goals.          [x]  Nutrition visits complete    []

## 2022-04-07 ENCOUNTER — NURSE ONLY (OUTPATIENT)
Dept: BARIATRICS/WEIGHT MGMT | Age: 37
End: 2022-04-07

## 2022-04-21 ENCOUNTER — OFFICE VISIT (OUTPATIENT)
Dept: BARIATRICS/WEIGHT MGMT | Age: 37
End: 2022-04-21
Payer: COMMERCIAL

## 2022-04-21 VITALS
HEART RATE: 108 BPM | HEIGHT: 62 IN | SYSTOLIC BLOOD PRESSURE: 118 MMHG | BODY MASS INDEX: 53.92 KG/M2 | WEIGHT: 293 LBS | DIASTOLIC BLOOD PRESSURE: 84 MMHG

## 2022-04-21 DIAGNOSIS — E60 LOW ZINC LEVEL: ICD-10-CM

## 2022-04-21 DIAGNOSIS — E61.1 LOW IRON: ICD-10-CM

## 2022-04-21 DIAGNOSIS — E66.01 MORBID OBESITY WITH BMI OF 70 AND OVER, ADULT (HCC): ICD-10-CM

## 2022-04-21 DIAGNOSIS — R79.0 LOW MAGNESIUM LEVEL: ICD-10-CM

## 2022-04-21 DIAGNOSIS — E53.8 LOW SERUM VITAMIN B12: ICD-10-CM

## 2022-04-21 DIAGNOSIS — K21.9 GASTROESOPHAGEAL REFLUX DISEASE WITHOUT ESOPHAGITIS: Primary | ICD-10-CM

## 2022-04-21 DIAGNOSIS — R73.03 PREDIABETES: ICD-10-CM

## 2022-04-21 DIAGNOSIS — E78.1 HYPERTRIGLYCERIDEMIA: ICD-10-CM

## 2022-04-21 PROCEDURE — 99213 OFFICE O/P EST LOW 20 MIN: CPT | Performed by: NURSE PRACTITIONER

## 2022-04-21 RX ORDER — DIPHENHYDRAMINE HYDROCHLORIDE 25 MG/1
CAPSULE ORAL
COMMUNITY
Start: 2022-03-24

## 2022-04-21 RX ORDER — TRANEXAMIC ACID 650 1/1
1300 TABLET ORAL
COMMUNITY
End: 2022-08-31

## 2022-04-21 RX ORDER — METFORMIN HYDROCHLORIDE 500 MG/1
TABLET, EXTENDED RELEASE ORAL
COMMUNITY

## 2022-04-21 NOTE — PROGRESS NOTES
Medical Nutrition Therapy   Metabolic and Bariatric Surgery         Supervised diet and exercise preparation  Visit 4 out of 6  Pt reports: She states she has started a new med which she thinks has made her weight increase significantly this past month. She states she has been consistent with her nutrition goals; eating lean protein and, choosing colorful f/v. She also remains active by taking walks although no exercise log today. Pt is having an emergency surgery which has also caused a great deal of stress which could also contribute to weight gain. Changes in eating patterns to promote health are noted below on the goals number 19-22    Vitals: Wt Readings from Last 3 Encounters:   04/21/22 (!) 401 lb (181.9 kg)   03/18/22 (!) 387 lb (175.5 kg)   02/24/22 (!) 390 lb (176.9 kg)         Nutrition Assessment:   PES: Knowledge deficit related to healthy behaviors that support weight management post weight loss surgery as evidenced by Body mass index is 74.54 kg/m². Nutrition Assessment of Goal Attainment:  TREATMENT GOALS:    1. Pt  Completed 3 out of 5 goals. 2.TREATMENT GOALS FOR UPCOMING WEEK: continue all previous goals and add: # 0    All goals were planned with and agreed on by the patient. I want to improve my health because for better health  appt # NA G What is your next step? C 1 2 3 4 5 6 7 8 9     1  1 I will read the education binder provided to me and the   x 0 100            4  2 I will make my pschological evaluation appoinment. 0 0 0 0          4  3 I will bring this goal card to every appointment. 0 100 0 0           x 4 I will eliminate all tobacco/nicotine. x 5 I will limit alcoholic beverages to 5-4KN per week. 6 I will limit dining out to 3 times per week or less. x 7 I will eliminate sugary beverages. x 8 I will eliminate carbonated beverages. 9 I will eliminate drinking with a straw. 10 I will limit caffeinated beverages to 16oz daily. 4  11 I will limit log my exercise daily. 0 100 0 0            12 I will determine my calcium and mvi plan. 3  13 I will have 1-2 servings of lean protein present at each meal and minimeal. x  100 100 100           x 14 I will eat every 3-5 hours. 15 I will drink 64oz of fluid daily. x 16 I will eat slowly during meals and snacks. 17 I will limit fluids 4oz before after and during meals. 18 I will eat protein first at all meals followed by vegetables,  Fruit and lastly whole grains. 4  19 My first weight neutral approach is:  I will eat more colorful vegetables. x  100 100 100            20 My second weight neutral approach is:                 21  My Thirds weight neutral approach is:                 22                  23                  24                    Questions asked for goal understanding:                                                  Do you understand your goals? Do you have the information you need to achieve your goals? Do you have any questions  right now? [x]  Consistent goal achievement in the program thus far and further success with goals is expected. []  Unable to consistently make progress in goal achievement. At this time patient is not moving forward  in developing the skills needed for success after surgery. Plan:    Continue to follow monthly and review goals.          [x]  Nutrition visits complete    []

## 2022-04-21 NOTE — PROGRESS NOTES
Medical Weight Management Progress Note    Subjective      Patient being seen for medically supervised weight loss for the chronic conditions of GERD. She is working on the behavior changes discussed at the initial appointment. Patient continues on diet plan. Physical activity includes walking. Weight gain of 14 lbs since last visit. Sleep study needs to be rescheduled. Needs to schedule psych eval.  EGD scheduled 22. Needs pulmonary clearance. Planning to have D&C done tomorrow for abnormal uterine bleeding. Working toward bariatric surgery:    [x] Sleeve Gastrectomy                                                           [] Briana-en-Y Gastric Bypass    Allergies:  No Known Allergies    Past Medical History:   History reviewed. No pertinent past medical history. .    Past Surgical History:  Past Surgical History:   Procedure Laterality Date     SECTION         Family History:  History reviewed. No pertinent family history. Social History:  Social History     Socioeconomic History    Marital status: Single     Spouse name: Not on file    Number of children: Not on file    Years of education: Not on file    Highest education level: Not on file   Occupational History    Not on file   Tobacco Use    Smoking status: Former Smoker    Smokeless tobacco: Never Used   Substance and Sexual Activity    Alcohol use: Yes     Alcohol/week: 1.0 standard drink     Types: 1 Glasses of wine per week     Comment: occ    Drug use: Never    Sexual activity: Not on file   Other Topics Concern    Not on file   Social History Narrative    Not on file     Social Determinants of Health     Financial Resource Strain:     Difficulty of Paying Living Expenses: Not on file   Food Insecurity:     Worried About Running Out of Food in the Last Year: Not on file    Yung of Food in the Last Year: Not on file   Transportation Needs:     Lack of Transportation (Medical):  Not on file    Lack of Transportation (Non-Medical):  Not on file   Physical Activity:     Days of Exercise per Week: Not on file    Minutes of Exercise per Session: Not on file   Stress:     Feeling of Stress : Not on file   Social Connections:     Frequency of Communication with Friends and Family: Not on file    Frequency of Social Gatherings with Friends and Family: Not on file    Attends [employer] Services: Not on file    Active Member of Clubs or Organizations: Not on file    Attends Club or Organization Meetings: Not on file    Marital Status: Not on file   Intimate Partner Violence:     Fear of Current or Ex-Partner: Not on file    Emotionally Abused: Not on file    Physically Abused: Not on file    Sexually Abused: Not on file   Housing Stability:     Unable to Pay for Housing in the Last Year: Not on file    Number of Places Lived in the Last Year: Not on file    Unstable Housing in the Last Year: Not on file       Current Medications:  Current Outpatient Medications   Medication Sig Dispense Refill    BANOPHEN 25 MG capsule       metFORMIN (GLUCOPHAGE-XR) 500 MG extended release tablet metformin  mg tablet,extended release 24 hr      tranexamic acid (LYSTEDA) 650 MG TABS tablet Take 1,300 mg by mouth      ferrous sulfate (IRON 325) 325 (65 Fe) MG tablet Take 1 tablet by mouth daily (with breakfast) 30 tablet 0    vitamin D (ERGOCALCIFEROL) 1.25 MG (82289 UT) CAPS capsule TAKE 1 CAPSULE BY MOUTH 1 TIME A WEEK FOR 8 DOSES 12 capsule 0    vitamin B-12 (CYANOCOBALAMIN) 500 MCG tablet TAKE 1 TABLET BY MOUTH DAILY 90 tablet 0    ALPRAZolam (XANAX) 1 MG tablet       ketoconazole (NIZORAL) 2 % shampoo APPLY SHAMPOO TOPICIALLY THREE TIMES WEEKLY      ibuprofen (ADVIL;MOTRIN) 800 MG tablet ibuprofen 800 mg tablet      albuterol sulfate  (90 Base) MCG/ACT inhaler       diphenhydrAMINE HCl (BENADRYL ALLERGY PO) Take by mouth      famotidine (PEPCID) 20 MG tablet  (Patient not taking: Reported on 4/21/2022)       No current facility-administered medications for this visit. Vital Signs:  /84 (Site: Right Lower Arm, Position: Sitting, Cuff Size: Large Adult)   Pulse 108   Ht 5' 1.5\" (1.562 m)   Wt (!) 401 lb (181.9 kg)   BMI 74.54 kg/m²     BMI/Height/Weight:  Body mass index is 74.54 kg/m². Review of Systems - A review of systems was performed. All was negative unless otherwise documented in HPI. Constitutional: Negative for fever, chills and diaphoresis. HENT: Negative for hearing loss and trouble swallowing. Eyes: Negative for photophobia and visual disturbance. Respiratory: Negative for cough, shortness of breath and wheezing. Cardiovascular: Negative for chest pain and palpitations. Gastrointestinal: Negative for nausea, vomiting, abdominal pain, diarrhea, constipation, blood in stool and abdominal distention. Endocrine: Negative for polydipsia, polyphagia and polyuria. Genitourinary: Negative for dysuria, frequency, hematuria and difficulty urinating. Musculoskeletal: Negative for myalgias, joint swelling. Skin: Negative for pallor and rash. Neurological: Negative for dizziness, tremors, light-headedness and headaches. Psychiatric/Behavioral: Negative for sleep disturbance and dysphoric mood. Objective:      Physical Exam   Vital signs reviewed. General: Well-developed and well-nourished. No acute distress. Skin: Warm, dry and intact. HEENT: Normocephalic. EOMs intact. Conjunctivae normal. Neck supple. Cardiovascular: Normal rate, regular rhythm. Pulmonary/Chest: Normal effort. Lungs clear to auscultation. No rales, rhonchi or wheezing. Abdominal: Positive bowel sounds. Soft, nontender. Nondistended. Musculoskeletal: Movement x4. No edema. Neurological: Gait normal. Alert and oriented to person, place, and time. Psychiatric: Normal mood and affect.  Speech and behavior normal. Judgment and thought content normal. Cognition and memory intact. Assessment:       Diagnosis Orders   1. Gastroesophageal reflux disease without esophagitis  Vitamin B12 & Folate    Magnesium    Zinc    Iron and TIBC   2. Hypertriglyceridemia  Vitamin B12 & Folate    Magnesium    Zinc    Iron and TIBC   3. Morbid obesity with BMI of 70 and over, adult ([address]  Vitamin B12 & Folate    Magnesium    Zinc    Iron and TIBC   4. Low serum vitamin B12  Vitamin B12 & Folate    Magnesium    Zinc    Iron and TIBC   5. Low iron  Vitamin B12 & Folate    Magnesium    Zinc    Iron and TIBC   6. Low magnesium level  Vitamin B12 & Folate    Magnesium    Zinc    Iron and TIBC   7. Prediabetes  Vitamin B12 & Folate    Magnesium    Zinc    Iron and TIBC   8. Low zinc level  Vitamin B12 & Folate    Magnesium    Zinc    Iron and TIBC       Plan:    Dietitian visit today. Patient was encouraged to journal all food intake. Keep calorie level at approximately 3300-1366. Protein intake is to be a minimum of 60-80 grams per day. Water drinking was encouraged with a goal of 64oz-128oz daily. Beverages to be calorie free except for milk. Every other beverage should be water. Avoid soda. Continue to increase level of physical activity. Encouraged use of exercise log. Recheck labs as ordered. Risk for sleep apnea. BMI 74 and neck circumference 18\". Follow-up  Return in about 1 month (around 5/21/2022). Orders this encounter:  Orders Placed This Encounter   Procedures    Vitamin B12 & Folate     Standing Status:   Future     Standing Expiration Date:   4/21/2023    Magnesium     Standing Status:   Future     Standing Expiration Date:   4/21/2023    Zinc     Standing Status:   Future     Standing Expiration Date:   4/22/2023    Iron and TIBC     Standing Status:   Future     Standing Expiration Date:   4/21/2023     Order Specific Question:   Is Patient Fasting? Answer:   Yes     Order Specific Question:   No of Hours?      Answer:   12 hours       Prescriptions this encounter:  No orders of the defined types were placed in this encounter.       Electronically signed by:  Rowdy Meneses CNP

## 2022-04-25 ENCOUNTER — HOSPITAL ENCOUNTER (OUTPATIENT)
Dept: LAB | Age: 37
Setting detail: SPECIMEN
Discharge: HOME | End: 2022-04-25
Payer: COMMERCIAL

## 2022-04-25 DIAGNOSIS — Z01.818 PREOP TESTING: Primary | ICD-10-CM

## 2022-04-25 PROCEDURE — U0005 INFEC AGEN DETEC AMPLI PROBE: HCPCS

## 2022-04-25 PROCEDURE — U0003 INFECTIOUS AGENT DETECTION BY NUCLEIC ACID (DNA OR RNA); SEVERE ACUTE RESPIRATORY SYNDROME CORONAVIRUS 2 (SARS-COV-2) (CORONAVIRUS DISEASE [COVID-19]), AMPLIFIED PROBE TECHNIQUE, MAKING USE OF HIGH THROUGHPUT TECHNOLOGIES AS DESCRIBED BY CMS-2020-01-R: HCPCS

## 2022-04-26 LAB
SARS-COV-2: NORMAL
SARS-COV-2: NOT DETECTED
SOURCE: NORMAL

## 2022-05-02 ENCOUNTER — TELEPHONE (OUTPATIENT)
Dept: BARIATRICS/WEIGHT MGMT | Age: 37
End: 2022-05-02

## 2022-05-02 NOTE — TELEPHONE ENCOUNTER
Olga Lidia Negron from [address] [facility]le[facility] called with Nicholas [facility]alta stating that they needed updated notes on why pt. Needs sleep study, Olga Lidia Negron states Dr. Jamia Bee initial consult note is pas the 6mos of documentation needed. Please advise.

## 2022-05-05 NOTE — TELEPHONE ENCOUNTER
I addended my note from 4/21/22 to include risk for sleep apnea:  BMI 74, Neck circumference 18\". Please notify sleep center.

## 2022-05-05 NOTE — TELEPHONE ENCOUNTER
Spoke with Chari Marmolejo at [facility]. Erica's sleep study and let her know that notes have been updated. She will send over to authorization for approval. Spoke with patient and she verbalized understanding.

## 2022-05-05 NOTE — TELEPHONE ENCOUNTER
Please follow this thread to note by Essie 3 days ago related to request from sleep center for additional documentation.

## 2022-05-05 NOTE — TELEPHONE ENCOUNTER
Needs sleep study she has BMI of 74    If she is unwilling to do the study then I am not doing the surgery

## 2022-05-12 ENCOUNTER — OFFICE VISIT (OUTPATIENT)
Dept: BARIATRICS/WEIGHT MGMT | Age: 37
End: 2022-05-12
Payer: COMMERCIAL

## 2022-05-12 VITALS
HEART RATE: 89 BPM | WEIGHT: 293 LBS | SYSTOLIC BLOOD PRESSURE: 116 MMHG | BODY MASS INDEX: 53.92 KG/M2 | DIASTOLIC BLOOD PRESSURE: 74 MMHG | HEIGHT: 62 IN

## 2022-05-12 DIAGNOSIS — R79.0 LOW MAGNESIUM LEVEL: ICD-10-CM

## 2022-05-12 DIAGNOSIS — K21.9 GASTROESOPHAGEAL REFLUX DISEASE WITHOUT ESOPHAGITIS: Primary | ICD-10-CM

## 2022-05-12 DIAGNOSIS — E61.1 LOW IRON: ICD-10-CM

## 2022-05-12 DIAGNOSIS — E60 LOW ZINC LEVEL: ICD-10-CM

## 2022-05-12 DIAGNOSIS — E78.1 HYPERTRIGLYCERIDEMIA: ICD-10-CM

## 2022-05-12 DIAGNOSIS — E66.01 MORBID OBESITY WITH BMI OF 70 AND OVER, ADULT (HCC): ICD-10-CM

## 2022-05-12 DIAGNOSIS — R73.03 PREDIABETES: ICD-10-CM

## 2022-05-12 DIAGNOSIS — E53.8 LOW SERUM VITAMIN B12: ICD-10-CM

## 2022-05-12 PROCEDURE — 99213 OFFICE O/P EST LOW 20 MIN: CPT | Performed by: NURSE PRACTITIONER

## 2022-05-12 NOTE — PROGRESS NOTES
Medical Nutrition Therapy   Metabolic and Bariatric Surgery         Supervised diet and exercise preparation  Visit 5 out of 6  Pt reports:  Pt will start pre-op diet 2 weeks before next apt. Changes in eating patterns to promote health are noted below on the goals number 19-22    Vitals: Wt Readings from Last 3 Encounters:   05/12/22 (!) 406 lb (184.2 kg)   04/21/22 (!) 401 lb (181.9 kg)   03/18/22 (!) 387 lb (175.5 kg)         Nutrition Assessment:   PES: Knowledge deficit related to healthy behaviors that support weight management post weight loss surgery as evidenced by Body mass index is 75.47 kg/m². Nutrition Assessment of Goal Attainment:  TREATMENT GOALS:    1. Pt  Completed 2 out of 2 goals. 2.TREATMENT GOALS FOR UPCOMING WEEK: continue all previous goals and add: # 20    All goals were planned with and agreed on by the patient. I want to improve my health because for better health  appt # NA G What is your next step? C 1 2 3 4 5 6 7 8 9     1  1 I will read the education binder provided to me and the   x 0 100            4  2 I will make my pschological evaluation appoinment. 0 0 0 0          5  3 I will bring this goal card to every appointment. 0 100 0 0 100          x 4 I will eliminate all tobacco/nicotine. x 5 I will limit alcoholic beverages to 0-9MR per week. x 6 I will limit dining out to 3 times per week or less. x 7 I will eliminate sugary beverages. x 8 I will eliminate carbonated beverages. 9 I will eliminate drinking with a straw. x 10 I will limit caffeinated beverages to 16oz daily. 5  11 I will limit log my exercise daily. 0 100 0 0 100           12 I will determine my calcium and mvi plan. 3  13 I will have 1-2 servings of lean protein present at each meal and minimeal. x  100 100 100           x 14 I will eat every 3-5 hours.                  15 I will drink 64oz of fluid daily. x 16 I will eat slowly during meals and snacks. 17 I will limit fluids 4oz before after and during meals. 18 I will eat protein first at all meals followed by vegetables,  Fruit and lastly whole grains. 4  19 My first weight neutral approach is:  I will eat more colorful vegetables. x  100 100 100          5  20 My second weight neutral approach is:  I will do the pre-op diet. 21  My Thirds weight neutral approach is:                 22                  23                  24                    Questions asked for goal understanding:                                                  Do you understand your goals? Do you have the information you need to achieve your goals? Do you have any questions  right now? [x]  Consistent goal achievement in the program thus far and further success with goals is expected. []  Unable to consistently make progress in goal achievement. At this time patient is not moving forward  in developing the skills needed for success after surgery. Plan:    Continue to follow monthly and review goals.          [x]  Nutrition visits complete    []

## 2022-05-13 NOTE — PROGRESS NOTES
Medical Weight Management Progress Note    Subjective      Patient being seen for medically supervised weight loss for the chronic conditions of GERD. She is working on the behavior changes discussed at the initial appointment. Patient continues on diet plan. Physical activity includes walking. Weight gain of 5 lbs since last visit. Sleep study scheduled 6/3/22. Psych eval scheduled 22. EGD rescheduled 6/3/22. Needs pulmonary clearance. No current issues. Working toward bariatric surgery:    [x] Sleeve Gastrectomy                                                           [] Briana-en-Y Gastric Bypass    Allergies:  No Known Allergies    Past Medical History:   History reviewed. No pertinent past medical history. .    Past Surgical History:  Past Surgical History:   Procedure Laterality Date     SECTION         Family History:  History reviewed. No pertinent family history. Social History:  Social History     Socioeconomic History    Marital status: Single     Spouse name: Not on file    Number of children: Not on file    Years of education: Not on file    Highest education level: Not on file   Occupational History    Not on file   Tobacco Use    Smoking status: Former Smoker    Smokeless tobacco: Never Used   Substance and Sexual Activity    Alcohol use: Yes     Alcohol/week: 1.0 standard drink     Types: 1 Glasses of wine per week     Comment: occ    Drug use: Never    Sexual activity: Not on file   Other Topics Concern    Not on file   Social History Narrative    Not on file     Social Determinants of Health     Financial Resource Strain:     Difficulty of Paying Living Expenses: Not on file   Food Insecurity:     Worried About Running Out of Food in the Last Year: Not on file    Yung of Food in the Last Year: Not on file   Transportation Needs:     Lack of Transportation (Medical): Not on file    Lack of Transportation (Non-Medical):  Not on file   Physical Activity:     Days of Exercise per Week: Not on file    Minutes of Exercise per Session: Not on file   Stress:     Feeling of Stress : Not on file   Social Connections:     Frequency of Communication with Friends and Family: Not on file    Frequency of Social Gatherings with Friends and Family: Not on file    Attends [employer] Services: Not on file    Active Member of Clubs or Organizations: Not on file    Attends Club or Organization Meetings: Not on file    Marital Status: Not on file   Intimate Partner Violence:     Fear of Current or Ex-Partner: Not on file    Emotionally Abused: Not on file    Physically Abused: Not on file    Sexually Abused: Not on file   Housing Stability:     Unable to Pay for Housing in the Last Year: Not on file    Number of Places Lived in the Last Year: Not on file    Unstable Housing in the Last Year: Not on file       Current Medications:  Current Outpatient Medications   Medication Sig Dispense Refill    vitamin D (CHOLECALCIFEROL) 125 MCG (5000 UT) CAPS capsule Take 5,000 Units by mouth once a week      BANOPHEN 25 MG capsule       metFORMIN (GLUCOPHAGE-XR) 500 MG extended release tablet metformin  mg tablet,extended release 24 hr      tranexamic acid (LYSTEDA) 650 MG TABS tablet Take 1,300 mg by mouth      ferrous sulfate (IRON 325) 325 (65 Fe) MG tablet Take 1 tablet by mouth daily (with breakfast) 30 tablet 0    vitamin D (ERGOCALCIFEROL) 1.25 MG (05709 UT) CAPS capsule TAKE 1 CAPSULE BY MOUTH 1 TIME A WEEK FOR 8 DOSES 12 capsule 0    vitamin B-12 (CYANOCOBALAMIN) 500 MCG tablet TAKE 1 TABLET BY MOUTH DAILY 90 tablet 0    ALPRAZolam (XANAX) 1 MG tablet       ketoconazole (NIZORAL) 2 % shampoo APPLY SHAMPOO TOPICIALLY THREE TIMES WEEKLY      ibuprofen (ADVIL;MOTRIN) 800 MG tablet ibuprofen 800 mg tablet      albuterol sulfate  (90 Base) MCG/ACT inhaler       diphenhydrAMINE HCl (BENADRYL ALLERGY PO) Take by mouth      famotidine (PEPCID) 20 MG tablet  (Patient not taking: Reported on 4/21/2022)       No current facility-administered medications for this visit. Vital Signs:  /74 (Site: Right Upper Arm, Position: Sitting, Cuff Size: Thigh)   Pulse 89   Ht 5' 1.5\" (1.562 m)   Wt (!) 406 lb (184.2 kg)   BMI 75.47 kg/m²     BMI/Height/Weight:  Body mass index is 75.47 kg/m². Review of Systems - A review of systems was performed. All was negative unless otherwise documented in HPI. Constitutional: Negative for fever, chills and diaphoresis. HENT: Negative for hearing loss and trouble swallowing. Eyes: Negative for photophobia and visual disturbance. Respiratory: Negative for cough, shortness of breath and wheezing. Cardiovascular: Negative for chest pain and palpitations. Gastrointestinal: Negative for nausea, vomiting, abdominal pain, diarrhea, constipation, blood in stool and abdominal distention. Endocrine: Negative for polydipsia, polyphagia and polyuria. Genitourinary: Negative for dysuria, frequency, hematuria and difficulty urinating. Musculoskeletal: Negative for myalgias, joint swelling. Skin: Negative for pallor and rash. Neurological: Negative for dizziness, tremors, light-headedness and headaches. Psychiatric/Behavioral: Negative for sleep disturbance and dysphoric mood. Objective:      Physical Exam   Vital signs reviewed. General: Well-developed and well-nourished. No acute distress. Skin: Warm, dry and intact. HEENT: Normocephalic. EOMs intact. Conjunctivae normal. Neck supple. Cardiovascular: Normal rate, regular rhythm. Pulmonary/Chest: Normal effort. Lungs clear to auscultation. No rales, rhonchi or wheezing. Abdominal: Positive bowel sounds. Soft, nontender. Nondistended. Musculoskeletal: Movement x4. No edema. Neurological: Gait normal. Alert and oriented to person, place, and time. Psychiatric: Normal mood and affect.  Speech and behavior normal. Judgment and thought content normal. Cognition and memory intact. Assessment:       Diagnosis Orders   1. Gastroesophageal reflux disease without esophagitis  CAMI Borrego MD, Pulmonology, [facility]    Vitamin B12 & Folate    Vitamin B1    Vitamin D 25 Hydroxy    Vitamin D 25 Hydroxy    Zinc   2. Morbid obesity with BMI of 70 and over, adult ([address]  CAMI Borrego MD, Pulmonology, [facility]    Vitamin B12 & Folate    Vitamin B1    Vitamin D 25 Hydroxy    Vitamin D 25 Hydroxy    Zinc   3. Hypertriglyceridemia  CAMI Borrego MD, Pulmonology, [facility]    Vitamin B12 & Folate    Vitamin B1    Vitamin D 25 Hydroxy    Vitamin D 25 Hydroxy    Zinc   4. Low serum vitamin B12  CAMI Borrego MD, Pulmonology, [facility]    Vitamin B12 & Folate    Vitamin B1    Vitamin D 25 Hydroxy    Vitamin D 25 Hydroxy    Zinc   5. Low iron  CAMI Borrego MD, Pulmonology, [facility]    Vitamin B12 & Folate    Vitamin B1    Vitamin D 25 Hydroxy    Vitamin D 25 Hydroxy    Zinc   6. Low magnesium level  Mike Hebert MD, Pulmonology, [facility]    Vitamin B12 & Folate    Vitamin B1    Vitamin D 25 Hydroxy    Vitamin D 25 Hydroxy    Zinc   7. Prediabetes  CAMI Borrego MD, Pulmonology, [facility]    Vitamin B12 & Folate    Vitamin B1    Vitamin D 25 Hydroxy    Vitamin D 25 Hydroxy    Zinc   8. Low zinc level  CAMI Borrego MD, Pulmonology, [facility]    Vitamin B12 & Folate    Vitamin B1    Vitamin D 25 Hydroxy    Vitamin D 25 Hydroxy    Zinc       Plan:    Dietitian visit today. Patient was encouraged to journal all food intake. Keep calorie level at approximately 9618-1630. Protein intake is to be a minimum of 60-80 grams per day. Water drinking was encouraged with a goal of 64oz-128oz daily. Beverages to be calorie free except for milk. Every other beverage should be water. Avoid soda. Continue to increase level of physical activity. Encouraged use of exercise log. Recheck labs as ordered. Risk for sleep apnea. BMI 74 and neck circumference 18\". Referral to pulmonary given. Weight discussed. She will start the pre-op diet for weight loss. Follow-up  Return in about 1 month (around 6/12/2022). Orders this encounter:  Orders Placed This Encounter   Procedures    Vitamin B12 & Folate     Standing Status:   Future     Standing Expiration Date:   5/12/2023    Vitamin B1     Standing Status:   Future     Standing Expiration Date:   5/12/2023    Vitamin D 25 Hydroxy     Standing Status:   Future     Standing Expiration Date:   5/12/2023    Vitamin D 25 Hydroxy     Standing Status:   Future     Standing Expiration Date:   5/12/2023    Zinc     Standing Status:   Future     Standing Expiration Date:   5/13/2023   Jesica Beyer MD, Pulmonology, [facility]     Referral Priority:   Routine     Referral Type:   Eval and Treat     Referral Reason:   Specialty Services Required     Referred to Provider:   Mercedes Schofield MD     Requested Specialty:   Pulmonology     Number of Visits Requested:   1       Prescriptions this encounter:  No orders of the defined types were placed in this encounter.       Electronically signed by:  Mariluz Mendiola CNP

## 2022-05-27 ENCOUNTER — TELEPHONE (OUTPATIENT)
Dept: BARIATRICS/WEIGHT MGMT | Age: 37
End: 2022-05-27

## 2022-05-27 NOTE — TELEPHONE ENCOUNTER
lvm and mychart message      Just a reminder about your EGD on 6/3/22 at 10:10 am , please arrive at 9:00  am at the [facility] location. Go to Entrance \"C\". Nothing to eat or drink after midnight.  And you must have a support person with you

## 2022-06-02 ENCOUNTER — ANESTHESIA EVENT (OUTPATIENT)
Dept: OPERATING ROOM | Age: 37
End: 2022-06-02
Payer: COMMERCIAL

## 2022-06-03 ENCOUNTER — ANESTHESIA (OUTPATIENT)
Dept: OPERATING ROOM | Age: 37
End: 2022-06-03
Payer: COMMERCIAL

## 2022-06-03 ENCOUNTER — HOSPITAL ENCOUNTER (OUTPATIENT)
Age: 37
Setting detail: OUTPATIENT SURGERY
Discharge: HOME | End: 2022-06-03
Attending: SURGERY | Admitting: SURGERY
Payer: COMMERCIAL

## 2022-06-03 VITALS
DIASTOLIC BLOOD PRESSURE: 90 MMHG | WEIGHT: 293 LBS | HEART RATE: 99 BPM | OXYGEN SATURATION: 98 % | RESPIRATION RATE: 18 BRPM | HEIGHT: 61 IN | SYSTOLIC BLOOD PRESSURE: 142 MMHG | BODY MASS INDEX: 55.32 KG/M2 | TEMPERATURE: 97.1 F

## 2022-06-03 DIAGNOSIS — K21.9 GASTROESOPHAGEAL REFLUX DISEASE, UNSPECIFIED WHETHER ESOPHAGITIS PRESENT: ICD-10-CM

## 2022-06-03 DIAGNOSIS — E66.9 OBESITY, UNSPECIFIED CLASSIFICATION, UNSPECIFIED OBESITY TYPE, UNSPECIFIED WHETHER SERIOUS COMORBIDITY PRESENT: ICD-10-CM

## 2022-06-03 LAB — HCG, PREGNANCY URINE (POC): NEGATIVE

## 2022-06-03 PROCEDURE — 3700000001 HC ADD 15 MINUTES (ANESTHESIA): Performed by: SURGERY

## 2022-06-03 PROCEDURE — 2500000003 HC RX 250 WO HCPCS: Performed by: NURSE ANESTHETIST, CERTIFIED REGISTERED

## 2022-06-03 PROCEDURE — 81025 URINE PREGNANCY TEST: CPT

## 2022-06-03 PROCEDURE — 88342 IMHCHEM/IMCYTCHM 1ST ANTB: CPT

## 2022-06-03 PROCEDURE — 2580000003 HC RX 258: Performed by: ANESTHESIOLOGY

## 2022-06-03 PROCEDURE — 2709999900 HC NON-CHARGEABLE SUPPLY: Performed by: SURGERY

## 2022-06-03 PROCEDURE — 43239 EGD BIOPSY SINGLE/MULTIPLE: CPT | Performed by: SURGERY

## 2022-06-03 PROCEDURE — 88305 TISSUE EXAM BY PATHOLOGIST: CPT

## 2022-06-03 PROCEDURE — 7100000010 HC PHASE II RECOVERY - FIRST 15 MIN: Performed by: SURGERY

## 2022-06-03 PROCEDURE — 3700000000 HC ANESTHESIA ATTENDED CARE: Performed by: SURGERY

## 2022-06-03 PROCEDURE — 6360000002 HC RX W HCPCS: Performed by: NURSE ANESTHETIST, CERTIFIED REGISTERED

## 2022-06-03 PROCEDURE — 7100000011 HC PHASE II RECOVERY - ADDTL 15 MIN: Performed by: SURGERY

## 2022-06-03 PROCEDURE — 3609012400 HC EGD TRANSORAL BIOPSY SINGLE/MULTIPLE: Performed by: SURGERY

## 2022-06-03 RX ORDER — DIPHENHYDRAMINE HYDROCHLORIDE 50 MG/ML
12.5 INJECTION INTRAMUSCULAR; INTRAVENOUS
Status: DISCONTINUED | OUTPATIENT
Start: 2022-06-03 | End: 2022-06-03 | Stop reason: HOSPADM

## 2022-06-03 RX ORDER — SODIUM CHLORIDE 0.9 % (FLUSH) 0.9 %
5-40 SYRINGE (ML) INJECTION PRN
Status: DISCONTINUED | OUTPATIENT
Start: 2022-06-03 | End: 2022-06-03 | Stop reason: HOSPADM

## 2022-06-03 RX ORDER — ONDANSETRON 2 MG/ML
4 INJECTION INTRAMUSCULAR; INTRAVENOUS
Status: DISCONTINUED | OUTPATIENT
Start: 2022-06-03 | End: 2022-06-03 | Stop reason: HOSPADM

## 2022-06-03 RX ORDER — SODIUM CHLORIDE 0.9 % (FLUSH) 0.9 %
5-40 SYRINGE (ML) INJECTION EVERY 12 HOURS SCHEDULED
Status: DISCONTINUED | OUTPATIENT
Start: 2022-06-03 | End: 2022-06-03 | Stop reason: HOSPADM

## 2022-06-03 RX ORDER — PROPOFOL 10 MG/ML
INJECTION, EMULSION INTRAVENOUS PRN
Status: DISCONTINUED | OUTPATIENT
Start: 2022-06-03 | End: 2022-06-03 | Stop reason: SDUPTHER

## 2022-06-03 RX ORDER — MEPERIDINE HYDROCHLORIDE 50 MG/ML
12.5 INJECTION INTRAMUSCULAR; INTRAVENOUS; SUBCUTANEOUS ONCE
Status: DISCONTINUED | OUTPATIENT
Start: 2022-06-03 | End: 2022-06-03 | Stop reason: HOSPADM

## 2022-06-03 RX ORDER — LIDOCAINE HYDROCHLORIDE 10 MG/ML
INJECTION, SOLUTION EPIDURAL; INFILTRATION; INTRACAUDAL; PERINEURAL PRN
Status: DISCONTINUED | OUTPATIENT
Start: 2022-06-03 | End: 2022-06-03 | Stop reason: SDUPTHER

## 2022-06-03 RX ORDER — SODIUM CHLORIDE 9 MG/ML
INJECTION, SOLUTION INTRAVENOUS PRN
Status: DISCONTINUED | OUTPATIENT
Start: 2022-06-03 | End: 2022-06-03 | Stop reason: HOSPADM

## 2022-06-03 RX ORDER — MORPHINE SULFATE 1 MG/ML
1 INJECTION, SOLUTION EPIDURAL; INTRATHECAL; INTRAVENOUS EVERY 5 MIN PRN
Status: DISCONTINUED | OUTPATIENT
Start: 2022-06-03 | End: 2022-06-03 | Stop reason: HOSPADM

## 2022-06-03 RX ORDER — SODIUM CHLORIDE, SODIUM LACTATE, POTASSIUM CHLORIDE, CALCIUM CHLORIDE 600; 310; 30; 20 MG/100ML; MG/100ML; MG/100ML; MG/100ML
INJECTION, SOLUTION INTRAVENOUS CONTINUOUS
Status: DISCONTINUED | OUTPATIENT
Start: 2022-06-03 | End: 2022-06-03 | Stop reason: HOSPADM

## 2022-06-03 RX ORDER — LIDOCAINE HYDROCHLORIDE 10 MG/ML
1 INJECTION, SOLUTION EPIDURAL; INFILTRATION; INTRACAUDAL; PERINEURAL
Status: DISCONTINUED | OUTPATIENT
Start: 2022-06-03 | End: 2022-06-03 | Stop reason: HOSPADM

## 2022-06-03 RX ORDER — SODIUM CHLORIDE 9 MG/ML
25 INJECTION, SOLUTION INTRAVENOUS PRN
Status: DISCONTINUED | OUTPATIENT
Start: 2022-06-03 | End: 2022-06-03 | Stop reason: HOSPADM

## 2022-06-03 RX ADMIN — LIDOCAINE HYDROCHLORIDE 100 MG: 10 INJECTION, SOLUTION EPIDURAL; INFILTRATION; INTRACAUDAL; PERINEURAL at 10:33

## 2022-06-03 RX ADMIN — PROPOFOL 150 MG: 10 INJECTION, EMULSION INTRAVENOUS at 10:33

## 2022-06-03 RX ADMIN — PROPOFOL 40 MG: 10 INJECTION, EMULSION INTRAVENOUS at 10:34

## 2022-06-03 RX ADMIN — SODIUM CHLORIDE, POTASSIUM CHLORIDE, SODIUM LACTATE AND CALCIUM CHLORIDE: 600; 310; 30; 20 INJECTION, SOLUTION INTRAVENOUS at 10:27

## 2022-06-03 RX ADMIN — PROPOFOL 20 MG: 10 INJECTION, EMULSION INTRAVENOUS at 10:37

## 2022-06-03 RX ADMIN — PROPOFOL 50 MG: 10 INJECTION, EMULSION INTRAVENOUS at 10:35

## 2022-06-03 ASSESSMENT — PAIN - FUNCTIONAL ASSESSMENT: PAIN_FUNCTIONAL_ASSESSMENT: NONE - DENIES PAIN

## 2022-06-03 NOTE — ANESTHESIA PRE PROCEDURE
Department of Anesthesiology  Preprocedure Note       Name:  Negrita De Oliveira   Age:  31 y.o.  :  1985                                          MRN:  [id number]         Date:  6/3/2022      Surgeon: Muna Whitten):  Renata Salazar DO    Procedure: Procedure(s):  EGD BIOPSY    Medications prior to admission:   Prior to Admission medications    Medication Sig Start Date End Date Taking?  Authorizing Provider   vitamin D (CHOLECALCIFEROL) 125 MCG (5000 UT) CAPS capsule Take 5,000 Units by mouth once a week    Historical Provider, MD   BANOPHEN 25 MG capsule  3/24/22   Historical Provider, MD   metFORMIN (GLUCOPHAGE-XR) 500 MG extended release tablet metformin  mg tablet,extended release 24 hr    Historical Provider, MD   tranexamic acid (LYSTEDA) 650 MG TABS tablet Take 1,300 mg by mouth    Historical Provider, MD   ferrous sulfate (IRON 325) 325 (65 Fe) MG tablet Take 1 tablet by mouth daily (with breakfast) 3/17/22   ROOPA Alba CNP   vitamin D (ERGOCALCIFEROL) 1.25 MG (42732 UT) CAPS capsule TAKE 1 CAPSULE BY MOUTH 1 TIME A WEEK FOR 8 DOSES 3/17/22   ROOPA Alba CNP   vitamin B-12 (CYANOCOBALAMIN) 500 MCG tablet TAKE 1 TABLET BY MOUTH DAILY 3/17/22 3/17/23  ROOPA Alba CNP   famotidine (PEPCID) 20 MG tablet  21   Historical Provider, MD   ALPRAZolam Alfreida Bottoms) 1 MG tablet  21   Historical Provider, MD   ketoconazole (NIZORAL) 2 % shampoo APPLY SHAMPOO TOPICIALLY THREE TIMES WEEKLY 21   Historical Provider, MD   ibuprofen (ADVIL;MOTRIN) 800 MG tablet ibuprofen 800 mg tablet    Historical Provider, MD   albuterol sulfate  (90 Base) MCG/ACT inhaler  21   Historical Provider, MD   diphenhydrAMINE HCl (BENADRYL ALLERGY PO) Take by mouth    Historical Provider, MD       Current medications:    Current Facility-Administered Medications   Medication Dose Route Frequency Provider Last Rate Last Admin    lidocaine PF 1 % injection 1 mL  1 mL IntraDERmal MCV 87 03/10/2022    RDW 15.2 03/10/2022     03/10/2022       CMP:   Lab Results   Component Value Date     03/10/2022    K 3.8 03/10/2022     03/10/2022    CO2 24 03/10/2022    BUN 15 03/10/2022    CREATININE 1.04 03/10/2022    GLUCOSE 106 03/10/2022    CALCIUM 8.7 03/10/2022    BILITOT 0.3 03/10/2022    ALKPHOS 65 03/10/2022    AST 12 03/10/2022    ALT 14 03/10/2022       POC Tests: No results for input(s): POCGLU, POCNA, POCK, POCCL, POCBUN, POCHEMO, POCHCT in the last 72 hours. Coags: No results found for: PROTIME, INR, APTT    HCG (If Applicable): Negative 2/4/53   No results found for: PREGTESTUR, PREGSERUM, HCG, HCGQUANT     ABGs: No results found for: PHART, PO2ART, RCN8DLD, ZHB1UAP, BEART, O0DCINIX     Type & Screen (If Applicable):  No results found for: LABABO, LABRH    Drug/Infectious Status (If Applicable):  No results found for: HIV, HEPCAB    COVID-19 Screening (If Applicable):   Lab Results   Component Value Date    COVID19 Not Detected 04/25/2022           Anesthesia Evaluation  Patient summary reviewed and Nursing notes reviewed no history of anesthetic complications:   Airway: Mallampati: II  TM distance: >3 FB   Neck ROM: full  Mouth opening: > = 3 FB   Dental: normal exam         Pulmonary:Negative Pulmonary ROS and normal exam  breath sounds clear to auscultation                             Cardiovascular:  Exercise tolerance: no interval change,   (+) hyperlipidemia        Rhythm: regular  Rate: normal                    Neuro/Psych:   Negative Neuro/Psych ROS              GI/Hepatic/Renal:   (+) GERD:, morbid obesity         ROS comment: Super morbid obesity. Endo/Other: Negative Endo/Other ROS                     ROS comment: Prediabetes Abdominal:   (+) obese,     Abdomen: soft. Vascular: negative vascular ROS. Other Findings:           Anesthesia Plan      MAC     ASA 3             Anesthetic plan and risks discussed with patient.       Plan discussed with CRNA.                     Misti Deal MD   6/3/2022

## 2022-06-03 NOTE — ANESTHESIA POSTPROCEDURE EVALUATION
POST- ANESTHESIA EVALUATION       Pt Name: Braulio Harrison  MRN: [id number]  [place]: 1985  Date of evaluation: 6/3/2022  Time:  11:51 AM      BP (!) 142/90   Pulse 99   Temp 97.1 °F (36.2 °C) (Temporal)   Resp 18   Ht 5' 1\" (1.549 m)   Wt (!) 398 lb (180.5 kg)   SpO2 98%   BMI 75.20 kg/m²      Consciousness Level  Awake  Cardiopulmonary Status  Stable  Pain Adequately Treated YES  Nausea / Vomiting  NO  Adequate Hydration  YES  Anesthesia Related Complications NONE      Electronically signed by Josué Mathur MD on 6/3/2022 at 11:51 AM       Department of Anesthesiology  Postprocedure Note    Patient: Braulio Harrison  MRN: [id number]  [place]: 1985  Date of evaluation: 6/3/2022  Time:  11:50 AM     Procedure Summary     Date: 06/03/22 Room / Location: [address]    Anesthesia Start: 0911 Anesthesia Stop: 7688    Procedure: EGD BIOPSY (N/A ) Diagnosis:       Gastroesophageal reflux disease, unspecified whether esophagitis present      Obesity, unspecified classification, unspecified obesity type, unspecified whether serious comorbidity present      (GERD / OBESITY)    Surgeons: Maricruz Levine DO Responsible Provider: Josué Mathur MD    Anesthesia Type: MAC ASA Status: 3          Anesthesia Type: No value filed. Mert Phase I: Mert Score: 10    Mert Phase II: Mert Score: 10    Last vitals: Reviewed and per EMR flowsheets.        Anesthesia Post Evaluation

## 2022-06-03 NOTE — H&P
Subjective     The patient is a 31 y.o. female who is here to undergo EGD for GERD. There is no height or weight on file to calculate BMI. They are considering a bariatric procedure for morbid obesity. No past medical history on file. .    Review of Systems - A complete 14 point review of systems was performed. All was negative unless otherwise documented in HPI. Allergies:  No Known Allergies    Past Surgical History:  Past Surgical History:   Procedure Laterality Date     SECTION         Family History:  No family history on file. Social History:  Social History     Socioeconomic History    Marital status: Single     Spouse name: Not on file    Number of children: Not on file    Years of education: Not on file    Highest education level: Not on file   Occupational History    Not on file   Tobacco Use    Smoking status: Former Smoker    Smokeless tobacco: Never Used   Substance and Sexual Activity    Alcohol use: Yes     Alcohol/week: 1.0 standard drink     Types: 1 Glasses of wine per week     Comment: occ    Drug use: Never    Sexual activity: Not on file   Other Topics Concern    Not on file   Social History Narrative    Not on file     Social Determinants of Health     Financial Resource Strain:     Difficulty of Paying Living Expenses: Not on file   Food Insecurity:     Worried About Running Out of Food in the Last Year: Not on file    Yung of Food in the Last Year: Not on file   Transportation Needs:     Lack of Transportation (Medical): Not on file    Lack of Transportation (Non-Medical):  Not on file   Physical Activity:     Days of Exercise per Week: Not on file    Minutes of Exercise per Session: Not on file   Stress:     Feeling of Stress : Not on file   Social Connections:     Frequency of Communication with Friends and Family: Not on file    Frequency of Social Gatherings with Friends and Family: Not on file    Attends Church Services: Not on file   Luda Sherman Active Member of Clubs or Organizations: Not on file    Attends Club or Organization Meetings: Not on file    Marital Status: Not on file   Intimate Partner Violence:     Fear of Current or Ex-Partner: Not on file    Emotionally Abused: Not on file    Physically Abused: Not on file    Sexually Abused: Not on file   Housing Stability:     Unable to Pay for Housing in the Last Year: Not on file    Number of [place] in the Last Year: Not on file    Unstable Housing in the Last Year: Not on file       Current Facility-Administered Medications   Medication Dose Route Frequency Provider Last Rate Last Admin    lidocaine PF 1 % injection 1 mL  1 mL IntraDERmal Once PRN Julianna Farmer MD        lactated ringers infusion   IntraVENous Continuous Julianna Farmer MD        sodium chloride flush 0.9 % injection 5-40 mL  5-40 mL IntraVENous 2 times per day Julianna Farmer MD        sodium chloride flush 0.9 % injection 5-40 mL  5-40 mL IntraVENous PRN Julianna Farmer MD        0.9 % sodium chloride infusion   IntraVENous PRN Julianna Farmer MD           Objective      Physical Exam  There were no vitals taken for this visit. Constitutional:  Vital signs are normal. The patient appears well-developed and well-nourished. HEENT:   Head: Normocephalic. Atraumatic  Eyes: pupils are equal and reactive. No scleral icterus is present. Neck: No mass and no thyromegaly present. Cardiovascular: Normal rate, regular rhythm, S1 normal and S2 normal.    Pulmonary/Chest: Effort normal and breath sounds normal.   Abdominal: Soft. Normal appearance. There is no organomegaly. No tenderness. There is no rigidity, no rebound, no guarding and no Recinos's sign. Musculoskeletal:        Right lower leg: Normal. No tenderness and no edema. Left lower leg: Normal. No tenderness and no edema. Lymphadenopathy:     No cervical adenopathy, No Exrtemity Adenopathy.    Neurological: The patient is alert and oriented. Skin: Skin is warm, dry and intact. Psychiatric: The patient has a normal mood and affect.  Speech is normal and behavior is normal. Judgment and thought content normal. Cognition and memory are normal.     Assessment     Patient Active Problem List   Diagnosis    Gastroesophageal reflux disease without esophagitis    Morbid obesity with BMI of 70 and over, adult ([facility] [address]    Hypertriglyceridemia    Low serum vitamin B12    Low iron    Low magnesium level    Prediabetes    Low zinc level    Vitamin D deficiency       Plan   EGD

## 2022-06-03 NOTE — OP NOTE
[facility][1][facility] [place]  [address] [facility] [address]  Dept: [phone]  Loc: [phone]    Preoperative Diagnosis:  GERD, Morbid obesity, BMI of Body mass index is 75.2 kg/m². Postoperative Diagnosis: GERD, Morbid obesity, BMI of Body mass index is 75.2 kg/m². Procedure: Esophagogastroduodenoscopy with Biopsy    Surgeon: Ella Monroy DO    Assistant:    Anesthesia: MAC, see anesthesia records    Specimen:    1) Antrum for H. Pylori    Findings:  No hiatal hernia, normal duodenum, mild antral gastritis    EBL: NONE    Operative Narrative: The risks and benefits were explained in detail to the patient who agreed and consented to the procedure. The patient was taken to the endoscopic suite and placed in a lateral position. Oxygen was administered via nasal cannula and a mouth guard was placed. MAC was administered via the anesthetic team.      The endoscope was then advanced into the oropharynx and down into the esophagus under direct visualization. The scope was further advanced through the esophagus, GE junction and stomach to the pylorus under visualization. The scope was passed through the pylorus and duodenal sweep performed, advancing and visualizing to the second portion of the duodenum. The scope was then withdrawn to the antrum and cold forceps were used to take biopsies of the antrum for H. Pylori. Appropriate hemostasis was noted. The scope was then retroflexed to visualize the GE junction. Evidence of a hiatal hernia was not noted. The scope was then slowly withdrawn through the GE junction. The Z line was noted. The stomach was then decompressed. The scope was withdrawn from the esophagus and no further lesions noted. The scope was withdrawn. The patient tolerated the procedure well. She will follow up with the Bariatric Clinic for further assessment.

## 2022-06-06 LAB — SURGICAL PATHOLOGY REPORT: NORMAL

## 2022-06-09 ENCOUNTER — TELEPHONE (OUTPATIENT)
Dept: BARIATRICS/WEIGHT MGMT | Age: 37
End: 2022-06-09

## 2022-06-09 DIAGNOSIS — R06.09 DYSPNEA ON EXERTION: ICD-10-CM

## 2022-06-09 DIAGNOSIS — E66.01 MORBID OBESITY WITH BMI OF 70 AND OVER, ADULT (HCC): ICD-10-CM

## 2022-06-09 DIAGNOSIS — R06.83 SNORING: Primary | ICD-10-CM

## 2022-06-09 NOTE — TELEPHONE ENCOUNTER
Patient notified by sleep center, sleep study not covered by insurance, they will cover home sleep study.  Order pending, please advise

## 2022-06-12 RX ORDER — LANSOPRAZOLE, AMOXICILLIN, CLARITHROMYCIN 30-500-500
KIT ORAL
Qty: 28 EACH | Refills: 0 | Status: SHIPPED | OUTPATIENT
Start: 2022-06-12 | End: 2022-07-27 | Stop reason: ALTCHOICE

## 2022-06-24 ENCOUNTER — OFFICE VISIT (OUTPATIENT)
Dept: BARIATRICS/WEIGHT MGMT | Age: 37
End: 2022-06-24
Payer: COMMERCIAL

## 2022-06-24 VITALS
HEART RATE: 80 BPM | RESPIRATION RATE: 20 BRPM | SYSTOLIC BLOOD PRESSURE: 122 MMHG | BODY MASS INDEX: 55.32 KG/M2 | WEIGHT: 293 LBS | DIASTOLIC BLOOD PRESSURE: 72 MMHG | HEIGHT: 61 IN

## 2022-06-24 DIAGNOSIS — E66.01 MORBID OBESITY WITH BMI OF 70 AND OVER, ADULT (HCC): ICD-10-CM

## 2022-06-24 DIAGNOSIS — K21.9 GERD WITHOUT ESOPHAGITIS: Primary | ICD-10-CM

## 2022-06-24 DIAGNOSIS — E53.8 LOW SERUM VITAMIN B12: ICD-10-CM

## 2022-06-24 DIAGNOSIS — E60 LOW ZINC LEVEL: ICD-10-CM

## 2022-06-24 DIAGNOSIS — E78.1 HYPERTRIGLYCERIDEMIA: ICD-10-CM

## 2022-06-24 DIAGNOSIS — R79.0 LOW MAGNESIUM LEVEL: ICD-10-CM

## 2022-06-24 DIAGNOSIS — A04.8 POSITIVE H. PYLORI TEST: ICD-10-CM

## 2022-06-24 DIAGNOSIS — E61.1 LOW IRON: ICD-10-CM

## 2022-06-24 DIAGNOSIS — R73.03 PREDIABETES: ICD-10-CM

## 2022-06-24 PROCEDURE — 99213 OFFICE O/P EST LOW 20 MIN: CPT | Performed by: NURSE PRACTITIONER

## 2022-06-24 NOTE — PROGRESS NOTES
Medical Nutrition Therapy   Metabolic and Bariatric Surgery         Supervised diet and exercise preparation  Visit 6 out of 6  Pt reports:      Changes in eating patterns to promote health are noted below on the goals number 19-22    Vitals: Wt Readings from Last 3 Encounters:   06/24/22 (!) 398 lb (180.5 kg)   06/03/22 (!) 398 lb (180.5 kg)   05/12/22 (!) 406 lb (184.2 kg)         Nutrition Assessment:   PES: Knowledge deficit related to healthy behaviors that support weight management post weight loss surgery as evidenced by Body mass index is 75.2 kg/m². Nutrition Assessment of Goal Attainment:  TREATMENT GOALS:    1. Pt  Completed 1 out of 3 goals. 2.TREATMENT GOALS FOR UPCOMING WEEK: continue all previous goals and add: # 0    All goals were planned with and agreed on by the patient. I want to improve my health because for better health  appt # NA G What is your next step? C 1 2 3 4 5 6 7 8 9     1  1 I will read the education binder provided to me and the   x 0 100            4  2 I will make my pschological evaluation appoinment. 0 0 0 0          6  3 I will bring this goal card to every appointment. 0 100 0 0 100 0         x 4 I will eliminate all tobacco/nicotine. x 5 I will limit alcoholic beverages to 1-2GB per week. x 6 I will limit dining out to 3 times per week or less. x 7 I will eliminate sugary beverages. x 8 I will eliminate carbonated beverages. 9 I will eliminate drinking with a straw. x 10 I will limit caffeinated beverages to 16oz daily. 6  11 I will limit log my exercise daily. 0 100 0 0 100 0          12 I will determine my calcium and mvi plan. 3  13 I will have 1-2 servings of lean protein present at each meal and minimeal. x  100 100 100           x 14 I will eat every 3-5 hours. 15 I will drink 64oz of fluid daily.                 x 16 I will eat slowly during meals and snacks. 17 I will limit fluids 4oz before after and during meals. 18 I will eat protein first at all meals followed by vegetables,  Fruit and lastly whole grains. 4  19 My first weight neutral approach is:  I will eat more colorful vegetables. x  100 100 100          6  20 My second weight neutral approach is:  I will do the pre-op diet. 100          21  My Thirds weight neutral approach is:                 22                  23                  24                    Questions asked for goal understanding:                                                  Do you understand your goals? Do you have the information you need to achieve your goals? Do you have any questions  right now? []  Consistent goal achievement in the program thus far and further success with goals is expected. []  Unable to consistently make progress in goal achievement. At this time patient is not moving forward  in developing the skills needed for success after surgery. Plan:    Continue to follow monthly and review goals.          [x]  Nutrition visits complete    []

## 2022-06-24 NOTE — PROGRESS NOTES
Medical Weight Management Progress Note    Subjective      Patient being seen for medically supervised weight loss for the chronic conditions of GERD. She is working on the behavior changes discussed at the initial appointment. Patient continues on diet plan. Physical activity includes walking. Weight loss of 8 lbs since last visit. In clinic sleep study was denied by insurance. She will complete home sleep study. Psych eval completed and clearance received. EGD completed and H Pylori positive. Taking treatment regimen as ordered. Needs pulmonary clearance. No current issues. Working toward bariatric surgery:    [x] Sleeve Gastrectomy                                                           [] Briana-en-Y Gastric Bypass    Allergies:  No Known Allergies    Past Medical History:     Past Medical History:   Diagnosis Date    Asthma    . Past Surgical History:  Past Surgical History:   Procedure Laterality Date     SECTION      DILATION AND CURETTAGE OF UTERUS  2022    UPPER GASTROINTESTINAL ENDOSCOPY  2022    UPPER GASTROINTESTINAL ENDOSCOPY N/A 6/3/2022    [facility]SY [facility] [address] [facility]       Family History:  History reviewed. No pertinent family history. Social History:  Social History     Socioeconomic History    Marital status: Single     Spouse name: Not on file    Number of children: Not on file    Years of education: Not on file    Highest education level: Not on file   Occupational History    Not on file   Tobacco Use    Smoking status: Former Smoker    Smokeless tobacco: Never Used   Vaping Use    Vaping Use: Never used   Substance and Sexual Activity    Alcohol use:  Yes     Alcohol/week: 1.0 standard drink     Types: 1 Glasses of wine per week     Comment: occ    Drug use: Never    Sexual activity: Not on file   Other Topics Concern    Not on file   Social History Narrative    Not on file     Social Determinants of Health     Financial Resource Strain:     Difficulty of Paying Living Expenses: Not on file   Food Insecurity:     Worried About Running Out of Food in the Last Year: Not on file    Yung of Food in the Last Year: Not on file   Transportation Needs:     Lack of Transportation (Medical): Not on file    Lack of Transportation (Non-Medical): Not on file   Physical Activity:     Days of Exercise per Week: Not on file    Minutes of Exercise per Session: Not on file   Stress:     Feeling of Stress : Not on file   Social Connections:     Frequency of Communication with Friends and Family: Not on file    Frequency of Social Gatherings with Friends and Family: Not on file    Attends Yazidi Services: Not on file    Active Member of [address] [place] [address] or Organizations: Not on file    Attends Club or Organization Meetings: Not on file    Marital Status: Not on file   Intimate Partner Violence:     Fear of Current or Ex-Partner: Not on file    Emotionally Abused: Not on file    Physically Abused: Not on file    Sexually Abused: Not on file   Housing Stability:     Unable to Pay for Housing in the Last Year: Not on file    Number of [place] in the Last Year: Not on file    Unstable Housing in the Last Year: Not on file       Current Medications:  Current Outpatient Medications   Medication Sig Dispense Refill    amoxicillin-clarithromycin-lansoprazole (PREVPAC) combo pack 1 dose orally twice daily for 14 days. May substitute omeprazole for lansoprazole if needed.  28 each 0    ferrous sulfate (IRON 325) 325 (65 Fe) MG tablet Take 1 tablet by mouth daily (with breakfast) 30 tablet 0    vitamin D (ERGOCALCIFEROL) 1.25 MG (60438 UT) CAPS capsule TAKE 1 CAPSULE BY MOUTH 1 TIME A WEEK FOR 8 DOSES 12 capsule 0    vitamin B-12 (CYANOCOBALAMIN) 500 MCG tablet TAKE 1 TABLET BY MOUTH DAILY 90 tablet 0    ALPRAZolam (XANAX) 1 MG tablet       ibuprofen (ADVIL;MOTRIN) 800 MG tablet ibuprofen 800 mg tablet      albuterol sulfate  (90 Base) MCG/ACT inhaler       diphenhydrAMINE HCl (BENADRYL ALLERGY PO) Take by mouth      vitamin D (CHOLECALCIFEROL) 125 MCG (5000 UT) CAPS capsule Take 5,000 Units by mouth once a week (Patient not taking: Reported on 6/24/2022)      BANOPHEN 25 MG capsule       metFORMIN (GLUCOPHAGE-XR) 500 MG extended release tablet metformin  mg tablet,extended release 24 hr (Patient not taking: Reported on 6/24/2022)      tranexamic acid (LYSTEDA) 650 MG TABS tablet Take 1,300 mg by mouth (Patient not taking: Reported on 6/3/2022)      famotidine (PEPCID) 20 MG tablet  (Patient not taking: Reported on 4/21/2022)      ketoconazole (NIZORAL) 2 % shampoo APPLY SHAMPOO TOPICIALLY THREE TIMES WEEKLY (Patient not taking: Reported on 6/24/2022)       No current facility-administered medications for this visit. Vital Signs:  /72 (Site: Right Upper Arm, Position: Sitting, Cuff Size: Large Adult)   Pulse 80   Resp 20   Ht 5' 1\" (1.549 m)   Wt (!) 398 lb (180.5 kg)   BMI 75.20 kg/m²     BMI/Height/Weight:  Body mass index is 75.2 kg/m². Review of Systems - A review of systems was performed. All was negative unless otherwise documented in HPI. Constitutional: Negative for fever, chills and diaphoresis. HENT: Negative for hearing loss and trouble swallowing. Eyes: Negative for photophobia and visual disturbance. Respiratory: Negative for cough, shortness of breath and wheezing. Cardiovascular: Negative for chest pain and palpitations. Gastrointestinal: Negative for nausea, vomiting, abdominal pain, diarrhea, constipation, blood in stool and abdominal distention. Endocrine: Negative for polydipsia, polyphagia and polyuria. Genitourinary: Negative for dysuria, frequency, hematuria and difficulty urinating. Musculoskeletal: Negative for myalgias, joint swelling. Skin: Negative for pallor and rash.    Neurological: Negative for dizziness, tremors, light-headedness and headaches. Psychiatric/Behavioral: Negative for sleep disturbance and dysphoric mood. Objective:      Physical Exam   Vital signs reviewed. General: Well-developed and well-nourished. No acute distress. Skin: Warm, dry and intact. HEENT: Normocephalic. EOMs intact. Conjunctivae normal. Neck supple. Cardiovascular: Normal rate, regular rhythm. Pulmonary/Chest: Normal effort. Lungs clear to auscultation. No rales, rhonchi or wheezing. Abdominal: Positive bowel sounds. Soft, nontender. Nondistended. Musculoskeletal: Movement x4. Bilateral lower extremity edema. Neurological: Gait normal. Alert and oriented to person, place, and time. Psychiatric: Normal mood and affect. Speech and behavior normal. Judgment and thought content normal. Cognition and memory intact. Assessment:       Diagnosis Orders   1. GERD without esophagitis  H. Pylori Breath Test, Adult   2. Hypertriglyceridemia  H. Pylori Breath Test, Adult   3. Morbid obesity with BMI of 70 and over, adult ([facility]  H. Pylori Breath Test, Adult   4. Low serum vitamin B12  H. Pylori Breath Test, Adult   5. Low iron  H. Pylori Breath Test, Adult   6. Low magnesium level  H. Pylori Breath Test, Adult   7. Prediabetes  H. Pylori Breath Test, Adult   8. Low zinc level  H. Pylori Breath Test, Adult   9. Positive H. pylori test         Plan:    Dietitian visit today. Patient was encouraged to journal all food intake. Keep calorie level at approximately 9825-0399. Protein intake is to be a minimum of 60-80 grams per day. Water drinking was encouraged with a goal of 64oz-128oz daily. Beverages to be calorie free except for milk. Every other beverage should be water. Avoid soda. Continue to increase level of physical activity. Encouraged use of exercise log. Recheck labs as previously ordered. Orders reprinted for her. Weight discussed. She will start the pre-op diet for weight loss.     Recheck H Pylori 2 weeks after

## 2022-07-08 ENCOUNTER — NURSE ONLY (OUTPATIENT)
Dept: BARIATRICS/WEIGHT MGMT | Age: 37
End: 2022-07-08

## 2022-07-08 VITALS — WEIGHT: 293 LBS | BODY MASS INDEX: 75.2 KG/M2

## 2022-07-08 DIAGNOSIS — E66.01 MORBID OBESITY WITH BMI OF 70 AND OVER, ADULT (HCC): Primary | ICD-10-CM

## 2022-07-08 NOTE — PROGRESS NOTES
Weight loss prior to surgery          Next visit date:  7/22/2022    Nutrition goals complete:    Yes    Initial Weight:  381lb    Wt Readings from Last 3 Encounters:   07/08/22 (!) 398 lb (180.5 kg)   06/24/22 (!) 398 lb (180.5 kg)   06/03/22 (!) 398 lb (180.5 kg)     . Is patient weight below start weight at todays visit:    No    Message routed to provider and follow up per protocol.

## 2022-07-21 ENCOUNTER — HOSPITAL ENCOUNTER (OUTPATIENT)
Dept: SLEEP CENTER | Age: 37
Discharge: HOME | End: 2022-07-23
Payer: COMMERCIAL

## 2022-07-21 DIAGNOSIS — E66.01 MORBID OBESITY WITH BMI OF 70 AND OVER, ADULT (HCC): ICD-10-CM

## 2022-07-21 DIAGNOSIS — R06.83 SNORING: ICD-10-CM

## 2022-07-21 DIAGNOSIS — R06.09 DYSPNEA ON EXERTION: ICD-10-CM

## 2022-07-21 PROCEDURE — G0399 HOME SLEEP TEST/TYPE 3 PORTA: HCPCS

## 2022-07-22 ENCOUNTER — OFFICE VISIT (OUTPATIENT)
Dept: BARIATRICS/WEIGHT MGMT | Age: 37
End: 2022-07-22
Payer: COMMERCIAL

## 2022-07-22 VITALS
HEART RATE: 98 BPM | DIASTOLIC BLOOD PRESSURE: 79 MMHG | HEIGHT: 62 IN | WEIGHT: 293 LBS | SYSTOLIC BLOOD PRESSURE: 140 MMHG | BODY MASS INDEX: 53.92 KG/M2

## 2022-07-22 DIAGNOSIS — E78.1 HYPERTRIGLYCERIDEMIA: ICD-10-CM

## 2022-07-22 DIAGNOSIS — N76.0 ACUTE VAGINITIS: ICD-10-CM

## 2022-07-22 DIAGNOSIS — K21.9 GERD WITHOUT ESOPHAGITIS: Primary | ICD-10-CM

## 2022-07-22 DIAGNOSIS — R73.03 PREDIABETES: ICD-10-CM

## 2022-07-22 DIAGNOSIS — E66.01 MORBID OBESITY WITH BMI OF 70 AND OVER, ADULT (HCC): ICD-10-CM

## 2022-07-22 LAB
FOLATE: 7.2
IRON: 42
MAGNESIUM: 1.8 MG/DL
TOTAL IRON BINDING CAPACITY: 403
VITAMIN B-12: 319

## 2022-07-22 PROCEDURE — 99213 OFFICE O/P EST LOW 20 MIN: CPT | Performed by: NURSE PRACTITIONER

## 2022-07-22 RX ORDER — FLUCONAZOLE 100 MG/1
150 TABLET ORAL DAILY
Qty: 1 TABLET | Refills: 0 | Status: SHIPPED | OUTPATIENT
Start: 2022-07-22 | End: 2022-07-23

## 2022-07-22 NOTE — PROGRESS NOTES
Medical Nutrition Therapy   Metabolic and Bariatric Surgery         Supervised diet and exercise preparation  Visit 7 out of 6  Pt reports:  did the preop diet for 10days and gained 5lb    Changes in eating patterns to promote health are noted below on the goals number 19-22    Vitals: Wt Readings from Last 3 Encounters:   07/22/22 (!) 405 lb (183.7 kg)   07/08/22 (!) 398 lb (180.5 kg)   06/24/22 (!) 398 lb (180.5 kg)         Nutrition Assessment:   PES: Knowledge deficit related to healthy behaviors that support weight management post weight loss surgery as evidenced by Body mass index is 75.28 kg/m². Nutrition Assessment of Goal Attainment:  TREATMENT GOALS:    1. Pt  Completed 1 out of 3 goals. 2.TREATMENT GOALS FOR UPCOMING WEEK: continue all previous goals and add: # 0    All goals were planned with and agreed on by the patient. I want to improve my health because for better health  appt # NA G What is your next step? C 1 2 3 4 5 6 7 8 9     1  1 I will read the education binder provided to me and the   x 0 100            4  2 I will make my pschological evaluation appoinment. 0 0 0 0          7  3 I will bring this goal card to every appointment. 0 100 0 0 100 0 0        x 4 I will eliminate all tobacco/nicotine. x 5 I will limit alcoholic beverages to 8-0GR per week. x 6 I will limit dining out to 3 times per week or less. x 7 I will eliminate sugary beverages. x 8 I will eliminate carbonated beverages. 9 I will eliminate drinking with a straw. x 10 I will limit caffeinated beverages to 16oz daily. 7  11 I will limit log my exercise daily. 0 100 0 0 100 0 0       7  12 I will determine my calcium and mvi plan. 3  13 I will have 1-2 servings of lean protein present at each meal and minimeal. x  100 100 100           x 14 I will eat every 3-5 hours.                  15 I will drink 64oz of fluid daily. x 16 I will eat slowly during meals and snacks. 17 I will limit fluids 4oz before after and during meals. 18 I will eat protein first at all meals followed by vegetables,  Fruit and lastly whole grains. 4  19 My first weight neutral approach is:  I will eat more colorful vegetables. x  100 100 100          7  20 My second weight neutral approach is:  I will do the meal replacement diet. 100 100         21  My Thirds weight neutral approach is:                 22                  23                  24                    Questions asked for goal understanding:                                                  Do you understand your goals? Do you have the information you need to achieve your goals? Do you have any questions  right now? []  Consistent goal achievement in the program thus far and further success with goals is expected. []  Unable to consistently make progress in goal achievement. At this time patient is not moving forward  in developing the skills needed for success after surgery. Plan:    Continue to follow monthly and review goals.          [x]  Nutrition visits complete    []

## 2022-07-22 NOTE — PROGRESS NOTES
Medical Weight Management Progress Note    Subjective      Patient being seen for medically supervised weight loss for the chronic conditions of GERD. She is working on the behavior changes discussed at the initial appointment. Patient continues on diet plan. Physical activity includes walking. Weight gain of 7 lbs since last visit. In clinic sleep study was denied by insurance. She completed home sleep study and pending report. Psych eval completed and clearance received. EGD completed and H Pylori positive. Finished treatment regimen and recheck test done, pending report. Needs pulmonary clearance. No current issues. Working toward bariatric surgery:    [x] Sleeve Gastrectomy                                                           [] Briana-en-Y Gastric Bypass    Allergies:  No Known Allergies    Past Medical History:     Past Medical History:   Diagnosis Date    Asthma    . Past Surgical History:  Past Surgical History:   Procedure Laterality Date     SECTION      DILATION AND CURETTAGE OF UTERUS  2022    UPPER GASTROINTESTINAL ENDOSCOPY  2022    UPPER GASTROINTESTINAL ENDOSCOPY N/A 6/3/2022    [facility] [id number] [facility]       Family History:  History reviewed. No pertinent family history. Social History:  Social History     Socioeconomic History    Marital status: Single     Spouse name: Not on file    Number of children: Not on file    Years of education: Not on file    Highest education level: Not on file   Occupational History    Not on file   Tobacco Use    Smoking status: Former    Smokeless tobacco: Never   Vaping Use    Vaping Use: Never used   Substance and Sexual Activity    Alcohol use:  Yes     Alcohol/week: 1.0 standard drink     Types: 1 Glasses of wine per week     Comment: occ    Drug use: Never    Sexual activity: Not on file   Other Topics Concern    Not on file   Social History Narrative    Not on file Social Determinants of Health     Financial Resource Strain: Not on file   Food Insecurity: Not on file   Transportation Needs: Not on file   Physical Activity: Not on file   Stress: Not on file   Social Connections: Not on file   Intimate Partner Violence: Not on file   Housing Stability: Not on file       Current Medications:  Current Outpatient Medications   Medication Sig Dispense Refill    amoxicillin-clarithromycin-lansoprazole (PREVPAC) combo pack 1 dose orally twice daily for 14 days. May substitute omeprazole for lansoprazole if needed. 28 each 0    vitamin D (CHOLECALCIFEROL) 125 MCG (5000 UT) CAPS capsule Take 5,000 Units by mouth once a week (Patient not taking: Reported on 6/24/2022)      BANOPHEN 25 MG capsule       metFORMIN (GLUCOPHAGE-XR) 500 MG extended release tablet metformin  mg tablet,extended release 24 hr (Patient not taking: Reported on 6/24/2022)      tranexamic acid (LYSTEDA) 650 MG TABS tablet Take 1,300 mg by mouth (Patient not taking: Reported on 6/3/2022)      ferrous sulfate (IRON 325) 325 (65 Fe) MG tablet Take 1 tablet by mouth daily (with breakfast) 30 tablet 0    vitamin D (ERGOCALCIFEROL) 1.25 MG (82740 UT) CAPS capsule TAKE 1 CAPSULE BY MOUTH 1 TIME A WEEK FOR 8 DOSES 12 capsule 0    vitamin B-12 (CYANOCOBALAMIN) 500 MCG tablet TAKE 1 TABLET BY MOUTH DAILY 90 tablet 0    famotidine (PEPCID) 20 MG tablet  (Patient not taking: Reported on 4/21/2022)      ALPRAZolam (XANAX) 1 MG tablet       ketoconazole (NIZORAL) 2 % shampoo APPLY SHAMPOO TOPICIALLY THREE TIMES WEEKLY (Patient not taking: Reported on 6/24/2022)      ibuprofen (ADVIL;MOTRIN) 800 MG tablet ibuprofen 800 mg tablet      albuterol sulfate  (90 Base) MCG/ACT inhaler       diphenhydrAMINE HCl (BENADRYL ALLERGY PO) Take by mouth       No current facility-administered medications for this visit. Vital Signs: There were no vitals taken for this visit.     BMI/Height/Weight:  There is no height or weight on file to calculate BMI. Review of Systems - A review of systems was performed. All was negative unless otherwise documented in HPI. Constitutional: Negative for fever, chills and diaphoresis. HENT: Negative for hearing loss and trouble swallowing. Eyes: Negative for photophobia and visual disturbance. Respiratory: Negative for cough, shortness of breath and wheezing. Cardiovascular: Negative for chest pain and palpitations. Gastrointestinal: Negative for nausea, vomiting, abdominal pain, diarrhea, constipation, blood in stool and abdominal distention. Endocrine: Negative for polydipsia, polyphagia and polyuria. Genitourinary: Negative for dysuria, frequency, hematuria and difficulty urinating. Musculoskeletal: Negative for myalgias, joint swelling. Skin: Negative for pallor and rash. Neurological: Negative for dizziness, tremors, light-headedness and headaches. Psychiatric/Behavioral: Negative for sleep disturbance and dysphoric mood. Objective:      Physical Exam   Vital signs reviewed. General: Well-developed and well-nourished. No acute distress. Skin: Warm, dry and intact. HEENT: Normocephalic. EOMs intact. Conjunctivae normal. Neck supple. Cardiovascular: Normal rate, regular rhythm. Pulmonary/Chest: Normal effort. Lungs clear to auscultation. No rales, rhonchi or wheezing. Abdominal: Positive bowel sounds. Soft, nontender. Nondistended. Musculoskeletal: Movement x4. Bilateral lower extremity edema. Neurological: Gait normal. Alert and oriented to person, place, and time. Psychiatric: Normal mood and affect. Speech and behavior normal. Judgment and thought content normal. Cognition and memory intact. Assessment:       Diagnosis Orders   1. GERD without esophagitis        2. Hypertriglyceridemia        3. Morbid obesity with BMI of 70 and over, adult ([facility] [address]        4. Prediabetes            Plan:    Dietitian visit today.   Patient was encouraged to journal all food intake. Keep calorie level at approximately 4078-9127. Protein intake is to be a minimum of 60-80 grams per day. Water drinking was encouraged with a goal of 64oz-128oz daily. Beverages to be calorie free except for milk. Every other beverage should be water. Avoid soda. Continue to increase level of physical activity. Encouraged use of exercise log. Recheck labs and recheck H Pylori were done at [address] [place] [address]. Staff to call for results. Weight discussed. She will start the pre-op diet for weight loss. Follow-up  Return in about 1 month (around 8/22/2022). Orders this encounter:  No orders of the defined types were placed in this encounter. Prescriptions this encounter:  No orders of the defined types were placed in this encounter.       Electronically signed by:  Aundrea Prince CNP

## 2022-07-27 DIAGNOSIS — E78.1 HYPERTRIGLYCERIDEMIA: ICD-10-CM

## 2022-07-27 DIAGNOSIS — R73.03 PREDIABETES: ICD-10-CM

## 2022-07-27 DIAGNOSIS — E66.01 MORBID OBESITY WITH BMI OF 70 AND OVER, ADULT (HCC): ICD-10-CM

## 2022-07-27 DIAGNOSIS — E61.1 LOW IRON: ICD-10-CM

## 2022-07-27 DIAGNOSIS — A04.8 INFECTION DUE TO CLARITHROMYCIN RESISTANT HELICOBACTER PYLORI: Primary | ICD-10-CM

## 2022-07-27 DIAGNOSIS — E53.8 LOW SERUM VITAMIN B12: ICD-10-CM

## 2022-07-27 DIAGNOSIS — Z16.29 INFECTION DUE TO CLARITHROMYCIN RESISTANT HELICOBACTER PYLORI: Primary | ICD-10-CM

## 2022-07-27 DIAGNOSIS — K21.9 GERD WITHOUT ESOPHAGITIS: ICD-10-CM

## 2022-07-27 DIAGNOSIS — K21.9 GASTROESOPHAGEAL REFLUX DISEASE WITHOUT ESOPHAGITIS: ICD-10-CM

## 2022-07-27 DIAGNOSIS — R79.0 LOW MAGNESIUM LEVEL: ICD-10-CM

## 2022-07-27 DIAGNOSIS — E60 LOW ZINC LEVEL: ICD-10-CM

## 2022-07-28 DIAGNOSIS — A04.8 POSITIVE H. PYLORI TEST: Primary | ICD-10-CM

## 2022-07-28 RX ORDER — BISMUTH SUBSALICYLATE 262 MG/1
524 TABLET, CHEWABLE ORAL 4 TIMES DAILY
Qty: 112 TABLET | Refills: 0 | Status: SHIPPED | OUTPATIENT
Start: 2022-07-28 | End: 2022-08-11

## 2022-07-28 RX ORDER — METRONIDAZOLE 500 MG/1
500 TABLET ORAL 2 TIMES DAILY
Qty: 28 TABLET | Refills: 0 | Status: SHIPPED | OUTPATIENT
Start: 2022-07-28 | End: 2022-08-11

## 2022-07-28 RX ORDER — LANSOPRAZOLE 30 MG/1
30 CAPSULE, DELAYED RELEASE ORAL 2 TIMES DAILY
Qty: 28 CAPSULE | Refills: 0 | Status: SHIPPED | OUTPATIENT
Start: 2022-07-28 | End: 2022-08-11

## 2022-07-28 RX ORDER — DOXYCYCLINE HYCLATE 100 MG
100 TABLET ORAL 2 TIMES DAILY
Qty: 28 TABLET | Refills: 0 | Status: SHIPPED | OUTPATIENT
Start: 2022-07-28 | End: 2022-08-11

## 2022-07-29 ENCOUNTER — TELEPHONE (OUTPATIENT)
Dept: BARIATRICS/WEIGHT MGMT | Age: 37
End: 2022-07-29

## 2022-08-10 LAB — STATUS: NORMAL

## 2022-08-10 PROCEDURE — G0399 HOME SLEEP TEST/TYPE 3 PORTA: HCPCS | Performed by: PSYCHIATRY & NEUROLOGY

## 2022-08-16 ENCOUNTER — TELEPHONE (OUTPATIENT)
Dept: BARIATRICS/WEIGHT MGMT | Age: 37
End: 2022-08-16

## 2022-08-16 NOTE — TELEPHONE ENCOUNTER
Unable to leave voice message (mailbox full) for patient to call and schedule appointment to do weight check and talk to [facility] about sleep study.

## 2022-08-22 DIAGNOSIS — G47.33 OSA (OBSTRUCTIVE SLEEP APNEA): Primary | ICD-10-CM

## 2022-08-31 ENCOUNTER — OFFICE VISIT (OUTPATIENT)
Dept: BARIATRICS/WEIGHT MGMT | Age: 37
End: 2022-08-31
Payer: COMMERCIAL

## 2022-08-31 VITALS
DIASTOLIC BLOOD PRESSURE: 89 MMHG | HEIGHT: 62 IN | BODY MASS INDEX: 53.92 KG/M2 | WEIGHT: 293 LBS | SYSTOLIC BLOOD PRESSURE: 117 MMHG | HEART RATE: 102 BPM

## 2022-08-31 DIAGNOSIS — K21.9 GERD WITHOUT ESOPHAGITIS: ICD-10-CM

## 2022-08-31 DIAGNOSIS — E66.01 MORBID OBESITY WITH BMI OF 70 AND OVER, ADULT (HCC): ICD-10-CM

## 2022-08-31 DIAGNOSIS — E78.1 HYPERTRIGLYCERIDEMIA: ICD-10-CM

## 2022-08-31 DIAGNOSIS — R73.03 PREDIABETES: ICD-10-CM

## 2022-08-31 DIAGNOSIS — G47.33 OSA (OBSTRUCTIVE SLEEP APNEA): Primary | ICD-10-CM

## 2022-08-31 DIAGNOSIS — A04.8 POSITIVE H. PYLORI TEST: ICD-10-CM

## 2022-08-31 PROCEDURE — 99213 OFFICE O/P EST LOW 20 MIN: CPT | Performed by: NURSE PRACTITIONER

## 2022-08-31 NOTE — PROGRESS NOTES
Medical Weight Management Progress Note    Subjective      Patient being seen for medically supervised weight loss for the chronic conditions of [place], GERD, HLD, Prediabetes. She is working on the behavior changes discussed at the initial appointment. Patient continues on diet plan. Physical activity includes walking daily. Weight loss of 3 lbs since last visit. In clinic sleep study was denied by insurance. She completed home sleep study and dx [place]. Needs titration visit. Needs pulmonary clearance. Psych eval completed and clearance received. EGD completed and H Pylori positive. Finished treatment regimen and recheck test again positive. Finished Pylera. No current issues. Working toward bariatric surgery:    [x] Sleeve Gastrectomy                                                           [] Briana-en-Y Gastric Bypass    Allergies:  No Known Allergies    Past Medical History:     Past Medical History:   Diagnosis Date    Asthma    . Past Surgical History:  Past Surgical History:   Procedure Laterality Date     SECTION      DILATION AND CURETTAGE OF UTERUS  2022    UPPER GASTROINTESTINAL ENDOSCOPY  2022    UPPER GASTROINTESTINAL ENDOSCOPY N/A 6/3/2022    [facility] [address] [facility].       Family History:  History reviewed. No pertinent family history. Social History:  Social History     Socioeconomic History    Marital status: Single     Spouse name: Not on file    Number of children: Not on file    Years of education: Not on file    Highest education level: Not on file   Occupational History    Not on file   Tobacco Use    Smoking status: Former    Smokeless tobacco: Never   Vaping Use    Vaping Use: Never used   Substance and Sexual Activity    Alcohol use:  Yes     Alcohol/week: 1.0 standard drink     Types: 1 Glasses of wine per week     Comment: occ    Drug use: Never    Sexual activity: Not on file   Other Topics Concern    Not on file   Social History Narrative    Not on file     Social Determinants of Health     Financial Resource Strain: Not on file   Food Insecurity: Not on file   Transportation Needs: Not on file   Physical Activity: Not on file   Stress: Not on file   Social Connections: Not on file   Intimate Partner Violence: Not on file   Housing Stability: Not on file       Current Medications:  Current Outpatient Medications   Medication Sig Dispense Refill    BANOPHEN 25 MG capsule       metFORMIN (GLUCOPHAGE-XR) 500 MG extended release tablet metformin  mg tablet,extended release 24 hr      ferrous sulfate (IRON 325) 325 (65 Fe) MG tablet Take 1 tablet by mouth daily (with breakfast) 30 tablet 0    vitamin D (ERGOCALCIFEROL) 1.25 MG (02890 UT) CAPS capsule TAKE 1 CAPSULE BY MOUTH 1 TIME A WEEK FOR 8 DOSES 12 capsule 0    vitamin B-12 (CYANOCOBALAMIN) 500 MCG tablet TAKE 1 TABLET BY MOUTH DAILY 90 tablet 0    ALPRAZolam (XANAX) 1 MG tablet       ibuprofen (ADVIL;MOTRIN) 800 MG tablet ibuprofen 800 mg tablet      albuterol sulfate  (90 Base) MCG/ACT inhaler       diphenhydrAMINE HCl (BENADRYL ALLERGY PO) Take by mouth      lansoprazole (PREVACID) 30 MG delayed release capsule Take 1 capsule by mouth in the morning and 1 capsule in the evening. Do all this for 14 days. 28 capsule 0    vitamin D (CHOLECALCIFEROL) 125 MCG (5000 UT) CAPS capsule Take 5,000 Units by mouth once a week (Patient not taking: No sig reported)      tranexamic acid (LYSTEDA) 650 MG TABS tablet Take 1,300 mg by mouth (Patient not taking: Reported on 6/3/2022)      famotidine (PEPCID) 20 MG tablet  (Patient not taking: Reported on 4/21/2022)      ketoconazole (NIZORAL) 2 % shampoo APPLY SHAMPOO TOPICIALLY THREE TIMES WEEKLY (Patient not taking: No sig reported)       No current facility-administered medications for this visit.        Vital Signs:  /89 (Site: Right Upper Arm, Position: Sitting, Cuff Size: Large Adult)   Pulse (!) 102   Ht 5' 1.5\" (1.562 m)   Wt (!) 402 lb (182.3 kg)   BMI 74.73 kg/m²     BMI/Height/Weight:  Body mass index is 74.73 kg/m². Review of Systems - A review of systems was performed. All was negative unless otherwise documented in HPI. Constitutional: Negative for fever, chills and diaphoresis. HENT: Negative for hearing loss and trouble swallowing. Eyes: Negative for photophobia and visual disturbance. Respiratory: Negative for cough, shortness of breath and wheezing. Cardiovascular: Negative for chest pain and palpitations. Gastrointestinal: Negative for nausea, vomiting, abdominal pain, diarrhea, constipation, blood in stool and abdominal distention. Endocrine: Negative for polydipsia, polyphagia and polyuria. Genitourinary: Negative for dysuria, frequency, hematuria and difficulty urinating. Musculoskeletal: Negative for myalgias, joint swelling. Skin: Negative for pallor and rash. Neurological: Negative for dizziness, tremors, light-headedness and headaches. Psychiatric/Behavioral: Negative for sleep disturbance and dysphoric mood. Objective:      Physical Exam   Vital signs reviewed. General: Well-developed and well-nourished. No acute distress. Skin: Warm, dry and intact. HEENT: Normocephalic. EOMs intact. Conjunctivae normal. Neck supple. Cardiovascular: Normal rate, regular rhythm. Pulmonary/Chest: Normal effort. Lungs clear to auscultation. No rales, rhonchi or wheezing. Abdominal: Positive bowel sounds. Soft, nontender. Nondistended. Musculoskeletal: Movement x4. Bilateral lower extremity edema. Neurological: Gait normal. Alert and oriented to person, place, and time. Psychiatric: Normal mood and affect. Speech and behavior normal. Judgment and thought content normal. Cognition and memory intact. Assessment:       Diagnosis Orders   1. GERD without esophagitis  H. Pylori Breath Test, Adult      2. Positive H. pylori test  H. Pylori Breath Test, Adult      3. Prediabetes        4. Hypertriglyceridemia        5. Morbid obesity with BMI of 70 and over, adult [facility])            Plan:    Dietitian visit today. Patient was encouraged to journal all food intake. Keep calorie level at approximately 2310-7667. Protein intake is to be a minimum of 60-80 grams per day. Water drinking was encouraged with a goal of 64oz-128oz daily. Beverages to be calorie free except for milk. Every other beverage should be water. Avoid soda. Continue to increase level of physical activity. Encouraged use of exercise log. Recheck H Pylori positive. Finished Pylera tx. Needs recheck. Ordered today. Weight discussed. She will work with the dietitian on weight loss. Follow-up  Return in about 1 month (around 9/30/2022). Orders this encounter:  Orders Placed This Encounter   Procedures    H. Pylori Breath Test, Adult     Standing Status:   Future     Standing Expiration Date:   8/31/2023         Prescriptions this encounter:  No orders of the defined types were placed in this encounter.       Electronically signed by:  Danii Gotti CNP

## 2022-08-31 NOTE — PROGRESS NOTES
Medical Nutrition Therapy   Metabolic and Bariatric Surgery         Supervised diet and exercise preparation  Visit {NUMBERS 0-12:01864} out of {NUMBERS 0-12:03298}  Pt reports:  ***    Changes in eating patterns to promote health are noted below on the goals number 19-22    Vitals: Wt Readings from Last 3 Encounters:   08/31/22 (!) 402 lb (182.3 kg)   07/22/22 (!) 405 lb (183.7 kg)   07/08/22 (!) 398 lb (180.5 kg)         Nutrition Assessment:   PES: Knowledge deficit related to healthy behaviors that support weight management post weight loss surgery as evidenced by Body mass index is 74.73 kg/m². Nutrition Assessment of Goal Attainment:  TREATMENT GOALS:    1. Pt  Completed {NUMBERS 0-12:28386} out of {NUMBERS 0-12:78695} goals. 2.TREATMENT GOALS FOR UPCOMING WEEK: continue all previous goals and add: # {Numbers; 3-36:32944}    All goals were planned with and agreed on by the patient. ***    Questions asked for goal understanding:                                                  Do you understand your goals? Do you have the information you need to achieve your goals? Do you have any questions  right now? []  Consistent goal achievement in the program thus far and further success with goals is expected. []  Unable to consistently make progress in goal achievement. At this time patient is not moving forward  in developing the skills needed for success after surgery. Plan:    Continue to follow monthly and review goals.          []  Nutrition visits complete    []

## 2022-08-31 NOTE — PROGRESS NOTES
Medical Nutrition Therapy   Metabolic and Bariatric Surgery         Supervised diet and exercise preparation  Pt has completed nutrition goals      Pt reports:    What additional goals would you like to plan today? Vitals: Wt Readings from Last 3 Encounters:   08/31/22 (!) 402 lb (182.3 kg)   07/22/22 (!) 405 lb (183.7 kg)   07/08/22 (!) 398 lb (180.5 kg)         Nutrition Assessment:   PES: Knowledge deficit related to healthy behaviors that support weight management post weight loss surgery as evidenced by Body mass index is 74.73 kg/m². Nutrition Assessment of Goal Attainment:  TREATMENT GOALS:        I want to improve my health because for better health  appt # NA G What is your next step? C 1 2 3 4 5 6 7 8 9     1  1 I will read the education binder provided to me and the   x 0 100            4  2 I will make my pschological evaluation appoinment. 0 0 0 0          7  3 I will bring this goal card to every appointment. 0 100 0 0 100 0 0        x 4 I will eliminate all tobacco/nicotine. x 5 I will limit alcoholic beverages to 3-0IZ per week. x 6 I will limit dining out to 3 times per week or less. x 7 I will eliminate sugary beverages. x 8 I will eliminate carbonated beverages. x 9 I will eliminate drinking with a straw. x 10 I will limit caffeinated beverages to 16oz daily. 7  11 I will limit log my exercise daily. 0 100 0 0 100 0 0       7  12 I will determine my calcium and mvi plan. x              3  13 I will have 1-2 servings of lean protein present at each meal and minimeal. x  100 100 100           x 14 I will eat every 3-5 hours. 15 I will drink 64oz of fluid daily. x               x 16 I will eat slowly during meals and snacks. 17 I will limit fluids 4oz before after and during meals.  x                18 I will eat protein first at all meals followed by vegetables,  Fruit and lastly whole grains. x              4  19 My first weight neutral approach is:  I will eat more colorful vegetables. x  100 100 100          7  20 My second weight neutral approach is:  I will do the meal replacement diet. 100 100         21  My Thirds weight neutral approach is:                 22                  23                  24                                                                         Continue to follow monthly and review goals.          [x]  Nutrition goals complete    [x]

## 2022-10-10 ENCOUNTER — TELEPHONE (OUTPATIENT)
Dept: BARIATRICS/WEIGHT MGMT | Age: 37
End: 2022-10-10

## 2022-10-10 NOTE — TELEPHONE ENCOUNTER
Patient called in, states was laid off by employer , currently has medicaid, went into effect 9/30/22  Medicaid # [id number]    Manju Solitario please confirm those benefits.  Patient notified will receive call back in 24-48 hours ( has appt on 10/12/22)

## 2022-10-13 ENCOUNTER — OFFICE VISIT (OUTPATIENT)
Dept: BARIATRICS/WEIGHT MGMT | Age: 37
End: 2022-10-13
Payer: MEDICAID

## 2022-10-13 ENCOUNTER — HOSPITAL ENCOUNTER (OUTPATIENT)
Dept: SLEEP CENTER | Age: 37
Discharge: HOME | End: 2022-10-15
Payer: MEDICAID

## 2022-10-13 VITALS — HEIGHT: 61 IN | WEIGHT: 293 LBS | BODY MASS INDEX: 55.32 KG/M2

## 2022-10-13 VITALS
HEART RATE: 89 BPM | SYSTOLIC BLOOD PRESSURE: 134 MMHG | HEIGHT: 62 IN | DIASTOLIC BLOOD PRESSURE: 77 MMHG | BODY MASS INDEX: 53.92 KG/M2 | WEIGHT: 293 LBS

## 2022-10-13 DIAGNOSIS — K21.9 GERD WITHOUT ESOPHAGITIS: ICD-10-CM

## 2022-10-13 DIAGNOSIS — G47.33 OSA (OBSTRUCTIVE SLEEP APNEA): ICD-10-CM

## 2022-10-13 DIAGNOSIS — G47.33 OSA (OBSTRUCTIVE SLEEP APNEA): Primary | ICD-10-CM

## 2022-10-13 DIAGNOSIS — E78.1 HYPERTRIGLYCERIDEMIA: ICD-10-CM

## 2022-10-13 DIAGNOSIS — E66.01 MORBID OBESITY WITH BMI OF 70 AND OVER, ADULT (HCC): ICD-10-CM

## 2022-10-13 DIAGNOSIS — A04.8 POSITIVE H. PYLORI TEST: ICD-10-CM

## 2022-10-13 DIAGNOSIS — E60 LOW ZINC LEVEL: ICD-10-CM

## 2022-10-13 DIAGNOSIS — R73.03 PREDIABETES: ICD-10-CM

## 2022-10-13 PROBLEM — R79.0 LOW MAGNESIUM LEVEL: Status: RESOLVED | Noted: 2022-03-17 | Resolved: 2022-10-13

## 2022-10-13 PROBLEM — E53.8 LOW SERUM VITAMIN B12: Status: RESOLVED | Noted: 2022-03-17 | Resolved: 2022-10-13

## 2022-10-13 PROBLEM — E61.1 LOW IRON: Status: RESOLVED | Noted: 2022-03-17 | Resolved: 2022-10-13

## 2022-10-13 PROCEDURE — 95811 POLYSOM 6/>YRS CPAP 4/> PARM: CPT

## 2022-10-13 PROCEDURE — 99213 OFFICE O/P EST LOW 20 MIN: CPT | Performed by: NURSE PRACTITIONER

## 2022-10-13 NOTE — PROGRESS NOTES
Medical Weight Management Progress Note    Subjective      Patient being seen for medically supervised weight loss for the chronic conditions of SANFORD, GERD, HLD, Prediabetes. She is working on the behavior changes discussed at the initial appointment. Patient continues on diet plan. Physical activity includes walking daily. Weight gain of 15 lbs since last visit. In clinic sleep study was denied by insurance. She completed home sleep study and dx SANFORD. Titration visit is tonight. Needs pulmonary clearance. Psych eval completed and clearance received. EGD completed and H Pylori positive. Finished treatment regimen and recheck test again positive. Finished Pylera. Needs repeat H Pylori breath test. No current issues. Working toward bariatric surgery:    [x] Sleeve Gastrectomy                                                           [] Briana-en-Y Gastric Bypass    Allergies:  No Known Allergies    Past Medical History:     Past Medical History:   Diagnosis Date    Asthma    . Past Surgical History:  Past Surgical History:   Procedure Laterality Date     SECTION      DILATION AND CURETTAGE OF UTERUS  2022    UPPER GASTROINTESTINAL ENDOSCOPY  2022    UPPER GASTROINTESTINAL ENDOSCOPY N/A 6/3/2022    [facility] [address] [facility]       Family History:  History reviewed. No pertinent family history. Social History:  Social History     Socioeconomic History    Marital status: Single     Spouse name: Not on file    Number of children: Not on file    Years of education: Not on file    Highest education level: Not on file   Occupational History    Not on file   Tobacco Use    Smoking status: Former    Smokeless tobacco: Never   Vaping Use    Vaping Use: Never used   Substance and Sexual Activity    Alcohol use:  Yes     Alcohol/week: 1.0 standard drink     Types: 1 Glasses of wine per week     Comment: occ    Drug use: Never    Sexual activity: Not on file   Other Topics Concern    Not on file   Social History Narrative    Not on file     Social Determinants of Health     Financial Resource Strain: Not on file   Food Insecurity: Not on file   Transportation Needs: Not on file   Physical Activity: Not on file   Stress: Not on file   Social Connections: Not on file   Intimate Partner Violence: Not on file   Housing Stability: Not on file       Current Medications:  Current Outpatient Medications   Medication Sig Dispense Refill    BANOPHEN 25 MG capsule       metFORMIN (GLUCOPHAGE-XR) 500 MG extended release tablet metformin  mg tablet,extended release 24 hr      ferrous sulfate (IRON 325) 325 (65 Fe) MG tablet Take 1 tablet by mouth daily (with breakfast) 30 tablet 0    vitamin D (ERGOCALCIFEROL) 1.25 MG (08250 UT) CAPS capsule TAKE 1 CAPSULE BY MOUTH 1 TIME A WEEK FOR 8 DOSES 12 capsule 0    vitamin B-12 (CYANOCOBALAMIN) 500 MCG tablet TAKE 1 TABLET BY MOUTH DAILY 90 tablet 0    ALPRAZolam (XANAX) 1 MG tablet       ibuprofen (ADVIL;MOTRIN) 800 MG tablet ibuprofen 800 mg tablet      albuterol sulfate  (90 Base) MCG/ACT inhaler       diphenhydrAMINE HCl (BENADRYL ALLERGY PO) Take by mouth      lansoprazole (PREVACID) 30 MG delayed release capsule Take 1 capsule by mouth in the morning and 1 capsule in the evening. Do all this for 14 days. 28 capsule 0     No current facility-administered medications for this visit. Vital Signs:  /77 (Site: Right Upper Arm, Position: Sitting, Cuff Size: Large Adult)   Pulse 89   Ht 5' 1.5\" (1.562 m)   Wt (!) 417 lb (189.1 kg)   BMI 77.52 kg/m²     BMI/Height/Weight:  Body mass index is 77.52 kg/m². Review of Systems - A review of systems was performed. All was negative unless otherwise documented in HPI. Constitutional: Negative for fever, chills and diaphoresis. HENT: Negative for hearing loss and trouble swallowing. Eyes: Negative for photophobia and visual disturbance.    Respiratory: Negative for cough, shortness of breath and wheezing. Cardiovascular: Negative for chest pain and palpitations. Gastrointestinal: Negative for nausea, vomiting, abdominal pain, diarrhea, constipation, blood in stool and abdominal distention. Endocrine: Negative for polydipsia, polyphagia and polyuria. Genitourinary: Negative for dysuria, frequency, hematuria and difficulty urinating. Musculoskeletal: Negative for myalgias, joint swelling. Skin: Negative for pallor and rash. Neurological: Negative for dizziness, tremors, light-headedness and headaches. Psychiatric/Behavioral: Negative for sleep disturbance and dysphoric mood. Objective:      Physical Exam   Vital signs reviewed. General: Well-developed and well-nourished. No acute distress. Skin: Warm, dry and intact. HEENT: Normocephalic. EOMs intact. Conjunctivae normal. Neck supple. Cardiovascular: Normal rate, regular rhythm. Pulmonary/Chest: Normal effort. Lungs clear to auscultation. No rales, rhonchi or wheezing. Abdominal: Positive bowel sounds. Soft, nontender. Nondistended. Musculoskeletal: Movement x4. Bilateral lower extremity edema. Neurological: Gait normal. Alert and oriented to person, place, and time. Psychiatric: Normal mood and affect. Speech and behavior normal. Judgment and thought content normal. Cognition and memory intact. Assessment:       Diagnosis Orders   1. SANFORD (obstructive sleep apnea)        2. GERD without esophagitis        3. Hypertriglyceridemia        4. Morbid obesity with BMI of 70 and over, adult ([facility] [address]        5. Prediabetes        6. Low zinc level  Zinc      7. Positive H. pylori test            Plan:    Dietitian visit today. Patient was encouraged to journal all food intake. Keep calorie level at approximately 1103-8466. Protein intake is to be a minimum of 60-80 grams per day. Water drinking was encouraged with a goal of 64oz-128oz daily. Beverages to be calorie free except for milk. Every other beverage should be water. Avoid soda. Continue to increase level of physical activity. Encouraged use of exercise log. Recheck H Pylori positive. Finished Pylera tx. Needs recheck. Reprinted order. Recheck Zinc level ordered. Weight discussed. She will work with the dietitian on weight loss. Follow-up  Return in about 1 month (around 11/13/2022). Orders this encounter:  Orders Placed This Encounter   Procedures    Zinc     Standing Status:   Future     Standing Expiration Date:   10/14/2023         Prescriptions this encounter:  No orders of the defined types were placed in this encounter.       Electronically signed by:  Ivone Stephenson CNP

## 2022-10-14 NOTE — PROGRESS NOTES
Medical Nutrition Therapy   Metabolic and Bariatric Surgery         Supervised diet and exercise preparation  Pt has completed nutrition goals    What additional goals would you like to plan today? She will do the pre-op diet 2 weeks before next apt. Vitals: Wt Readings from Last 3 Encounters:   10/13/22 (!) 417 lb (189.1 kg)   10/13/22 (!) 417 lb (189.1 kg)   08/31/22 (!) 402 lb (182.3 kg)         Nutrition Assessment:   PES: Knowledge deficit related to healthy behaviors that support weight management post weight loss surgery as evidenced by Body mass index is 77.52 kg/m². Nutrition Assessment of Goal Attainment:  TREATMENT GOALS:          I want to improve my health because for better health  appt # NA G What is your next step? C 1 2 3 4 5 6 7 8 9     1  1 I will read the education binder provided to me and the   x 0 100            4  2 I will make my pschological evaluation appoinment. 0 0 0 0          7  3 I will bring this goal card to every appointment. 0 100 0 0 100 0 0        x 4 I will eliminate all tobacco/nicotine. x 5 I will limit alcoholic beverages to 3-7HV per week. x 6 I will limit dining out to 3 times per week or less. x 7 I will eliminate sugary beverages. x 8 I will eliminate carbonated beverages. x 9 I will eliminate drinking with a straw. x 10 I will limit caffeinated beverages to 16oz daily. 7  11 I will limit log my exercise daily. 0 100 0 0 100 0 0       7  12 I will determine my calcium and mvi plan. x              3  13 I will have 1-2 servings of lean protein present at each meal and minimeal. x  100 100 100           x 14 I will eat every 3-5 hours. 15 I will drink 64oz of fluid daily. x               x 16 I will eat slowly during meals and snacks. 17 I will limit fluids 4oz before after and during meals.  x                18 I will eat protein first at all meals followed by vegetables,  Fruit and lastly whole grains. x              4  19 My first weight neutral approach is:  I will eat more colorful vegetables. x  100 100 100          7  20 My second weight neutral approach is:  I will do the meal replacement diet. 100 100         21  My Thirds weight neutral approach is:                 22                  23                  24                                                                         Continue to follow monthly and review goals.          [x]  Nutrition goals complete    [x]

## 2022-10-25 LAB — STATUS: NORMAL

## 2023-02-24 ENCOUNTER — TELEPHONE (OUTPATIENT)
Dept: BARIATRICS/WEIGHT MGMT | Age: 38
End: 2023-02-24

## 2023-02-24 NOTE — LETTER
.. [facility] ([facility]) [facility]  [address] [address]  Phone: [phone]  Fax: [phone]        February 24, 2023     [facility] [id number]      Dear Fabian Thomas: At  [facility] we strive to provide our patients with excellent medical care enhancing their  health and quality of  life. Our records indicate that you missed  your appointment with ROOPA Sanchez CNP  on 2/24/2023. Your  insurance  may  require consecutive  monthly visit  as  a qualification for bariatric surgery. Please call the office  at  [phone] to reschedule your appointment  to maintain your  consecutive  monthly visit requirement. We do recognize that everyones time is valuable and that appointment time is limited, therefore we do ask that you provide 24 hour notice if you are unable to keep your appointment. This is the third scheduled appointment that you have missed within the last twelve months. If you miss ANY more appointment, you may be dismissed from the practice.        Sincerely,        [facility] ([facility]) [facility] Management and Surgical Specialist

## 2023-03-24 ENCOUNTER — TELEPHONE (OUTPATIENT)
Dept: BARIATRICS/WEIGHT MGMT | Age: 38
End: 2023-03-24

## 2023-03-24 NOTE — LETTER
.. [address] [place] [address] [facility]  Papo Schaffer [address] [place] [id number]  Phone: [phone]  Fax: [phone]        March 24, 2023     [place] [id number]      Dear Tony Lerma: At  [address] [facility] we strive to provide our patients with excellent medical care enhancing their  health and quality of  life. Our records indicate that you missed  your appointment with ROOPA Bartlett CNP  on 3/24/2023. Your  insurance  may  require consecutive  monthly visit  as  a qualification for bariatric surgery. Please call the office  at  [phone] to reschedule your appointment  to maintain your  consecutive  monthly visit requirement. We do recognize that everyones time is valuable and that appointment time is limited, therefore we do ask that you provide 24 hour notice if you are unable to keep your appointment. This is the second scheduled appointment that you have missed within the last twelve months. If you miss ONE more appointment, you may be dismissed from the practice.        Sincerely,        [address] [facility] Management and Surgical Specialist

## 2023-06-09 DIAGNOSIS — E53.8 LOW VITAMIN B12 LEVEL: ICD-10-CM

## 2023-06-09 RX ORDER — CYANOCOBALAMIN (VITAMIN B-12) 500 MCG
TABLET ORAL
Qty: 90 TABLET | Refills: 0 | Status: SHIPPED | OUTPATIENT
Start: 2023-06-09

## (undated) DEVICE — ADAPTER,CATHETER/SYRINGE/LUER,STERILE: Brand: MEDLINE

## (undated) DEVICE — BASIN EMSIS 700ML GRAPHITE PLAS KID SHP GRAD

## (undated) DEVICE — Device: Brand: DEFENDO VALVE AND CONNECTOR KIT

## (undated) DEVICE — TUBING, SUCTION, 3/16" X 10', STRAIGHT: Brand: MEDLINE

## (undated) DEVICE — BITEBLOCK 54FR W/ DENT RIM BLOX

## (undated) DEVICE — PERRYSBURG ENDO PACK: Brand: MEDLINE INDUSTRIES, INC.

## (undated) DEVICE — CONNECTOR TBNG AUX H2O JET DISP FOR OLY 160/180 SER

## (undated) DEVICE — CANNULA IV 18GA L15IN BLNT FILL LUERLOCK HUB MJCT

## (undated) DEVICE — YANKAUER,FLEXIBLE HANDLE,REGLR CAPACITY: Brand: MEDLINE INDUSTRIES, INC.

## (undated) DEVICE — 4-PORT MANIFOLD: Brand: NEPTUNE 2

## (undated) DEVICE — SIMPLICITY FLUFF UNDERPAD 23X36, MODERATE: Brand: SIMPLICITY

## (undated) DEVICE — FORCEPS BX L240CM JAW DIA2.8MM L CAP W/ NDL MIC MESH TOOTH